# Patient Record
Sex: MALE | Race: WHITE | NOT HISPANIC OR LATINO | Employment: FULL TIME | URBAN - NONMETROPOLITAN AREA
[De-identification: names, ages, dates, MRNs, and addresses within clinical notes are randomized per-mention and may not be internally consistent; named-entity substitution may affect disease eponyms.]

---

## 2020-12-28 DIAGNOSIS — I73.9 PVD (PERIPHERAL VASCULAR DISEASE) (HCC): Primary | ICD-10-CM

## 2021-01-11 ENCOUNTER — OFFICE VISIT (OUTPATIENT)
Dept: CARDIAC SURGERY | Facility: CLINIC | Age: 51
End: 2021-01-11

## 2021-01-11 VITALS
HEART RATE: 96 BPM | DIASTOLIC BLOOD PRESSURE: 70 MMHG | HEIGHT: 72 IN | SYSTOLIC BLOOD PRESSURE: 116 MMHG | WEIGHT: 231.8 LBS | BODY MASS INDEX: 31.4 KG/M2 | OXYGEN SATURATION: 98 % | TEMPERATURE: 98.2 F

## 2021-01-11 DIAGNOSIS — E66.09 CLASS 1 OBESITY DUE TO EXCESS CALORIES WITH SERIOUS COMORBIDITY AND BODY MASS INDEX (BMI) OF 31.0 TO 31.9 IN ADULT: ICD-10-CM

## 2021-01-11 DIAGNOSIS — I10 BENIGN ESSENTIAL HTN: ICD-10-CM

## 2021-01-11 DIAGNOSIS — I73.9 PVD (PERIPHERAL VASCULAR DISEASE) (HCC): Primary | ICD-10-CM

## 2021-01-11 DIAGNOSIS — I82.432 ACUTE DEEP VEIN THROMBOSIS (DVT) OF POPLITEAL VEIN OF LEFT LOWER EXTREMITY (HCC): ICD-10-CM

## 2021-01-11 DIAGNOSIS — F17.218 NICOTINE DEPENDENCE, CIGARETTES, WITH OTHER NICOTINE-INDUCED DISORDERS: ICD-10-CM

## 2021-01-11 DIAGNOSIS — I26.99 ACUTE PULMONARY EMBOLISM WITHOUT ACUTE COR PULMONALE, UNSPECIFIED PULMONARY EMBOLISM TYPE (HCC): ICD-10-CM

## 2021-01-11 PROCEDURE — 99204 OFFICE O/P NEW MOD 45 MIN: CPT | Performed by: THORACIC SURGERY (CARDIOTHORACIC VASCULAR SURGERY)

## 2021-01-11 RX ORDER — LISINOPRIL 40 MG/1
40 TABLET ORAL DAILY
COMMUNITY
End: 2022-03-07 | Stop reason: HOSPADM

## 2021-01-11 RX ORDER — ASPIRIN 81 MG/1
81 TABLET, CHEWABLE ORAL DAILY
COMMUNITY
End: 2022-02-22 | Stop reason: HOSPADM

## 2021-01-11 NOTE — PROGRESS NOTES
1/11/2021    William Alan  1970    Chief Complaint:    Chief Complaint   Patient presents with   • Peripheral Vascular Disease       HPI:      PCP:  Omi Santamaria DO    50 y.o. male with HTN(stable, increased risk stroke, rupture), Hyperlipidemia(stable, increased risk cardiovascular events), Obesity(uncontrolled, increased risk cardiovascular events) and Smoker(uncontrolled, increased risk cardiovascular events) , DVT/PTE(stable), PVD(new, increase risk cardiovascular events).  smokes 1 PPD.  Moderate discoloration pain LEFT leg x 3 months.  Hospitalized 9/2020 [facility] DVT/PTE.  Anticoagulated eliquis, no longer taking.  States blood clot in leg gone on recent ultrasound [facility].. mother grandmother with varicose veins, spider veins.  No TIA stroke amaurosis.  No MI claudication. No other associated signs, symptoms or modifying factors.    9/2020 LEFT DVT, PTE ([facility]).  eliquis x 3 months  1/2021 ANDREW:  RIGHT 1.1 triphasic.   LEFT .97 triphasic.    The following portions of the patient's history were reviewed and updated as appropriate: allergies, current medications, past family history, past medical history, past social history, past surgical history and problem list.  Recent images independently reviewed.  Available laboratory values reviewed.    PMH:  Past Medical History:   Diagnosis Date   • HTN (hypertension)    • Hyperlipidemia    • Obesity      Past Surgical History:   Procedure Laterality Date   • AMPUTATION DIGIT Left 2007    partial index finger 1st toe (MRSA)     No family history on file.  Social History     Tobacco Use   • Smoking status: Current Every Day Smoker     Types: Cigarettes   • Smokeless tobacco: Never Used   Substance Use Topics   • Alcohol use: Never     Frequency: Never   • Drug use: Never       ALLERGIES:  No Known Allergies      MEDICATIONS:    Current Outpatient Medications:   •  aspirin 81 MG chewable tablet, Chew 81 mg Daily., Disp: , Rfl:   •  lisinopril (PRINIVIL,ZESTRIL) 40  "MG tablet, Take 40 mg by mouth Daily., Disp: , Rfl:     Review of Systems   Review of Systems   Constitution: Positive for malaise/fatigue. Negative for night sweats and weight loss.   HENT: Negative for hearing loss, hoarse voice and stridor.    Eyes: Negative for vision loss in left eye, vision loss in right eye and visual disturbance.   Cardiovascular: Positive for dyspnea on exertion and leg swelling. Negative for chest pain and palpitations.        Leg pain   Respiratory: Positive for cough and shortness of breath. Negative for hemoptysis.    Hematologic/Lymphatic: Negative for adenopathy and bleeding problem. Does not bruise/bleed easily.   Skin: Positive for color change. Negative for poor wound healing and rash.   Musculoskeletal: Positive for myalgias. Negative for arthritis, back pain, muscle weakness and neck pain.   Gastrointestinal: Negative for abdominal pain, dysphagia and heartburn.   Neurological: Negative for dizziness, numbness and seizures.   Psychiatric/Behavioral: Negative for altered mental status, depression and memory loss. The patient is not nervous/anxious.        Physical Exam   Vitals:    01/11/21 1303   BP: 116/70   BP Location: Left arm   Pulse: 96   Temp: 98.2 °F (36.8 °C)   TempSrc: Infrared   SpO2: 98%   Weight: 105 kg (231 lb 12.8 oz)   Height: 182.9 cm (72\")     Physical Exam  Constitutional:       General: He is not in acute distress.     Appearance: He is not ill-appearing.   HENT:      Head: Atraumatic.      Right Ear: Hearing normal.      Left Ear: Hearing normal.      Nose: No nasal deformity.      Mouth/Throat:      Dentition: Normal dentition. Does not have dentures.   Eyes:      General: Lids are normal.      Conjunctiva/sclera: Conjunctivae normal.      Pupils:      Right eye: Pupil is round and reactive.      Left eye: Pupil is round and reactive.   Neck:      Thyroid: No thyroid mass or thyromegaly.      Vascular: No carotid bruit or JVD.      Trachea: No tracheal " deviation.   Cardiovascular:      Rate and Rhythm: Normal rate and regular rhythm.      Pulses:           Carotid pulses are 2+ on the right side and 2+ on the left side.       Radial pulses are 2+ on the right side and 2+ on the left side.        Dorsalis pedis pulses are 2+ on the right side and 2+ on the left side.        Posterior tibial pulses are 1+ on the right side and 1+ on the left side.      Heart sounds: No murmur.      Comments: Trace edema  Moderate red-purple discoloration to bilateral legs  Moderate spider telangelectasias bilateral ankle, legs  Pulmonary:      Effort: Pulmonary effort is normal.      Breath sounds: Normal breath sounds.   Abdominal:      General: There is no distension.      Palpations: Abdomen is soft. There is no hepatomegaly, splenomegaly or mass.      Tenderness: There is no abdominal tenderness.   Musculoskeletal:         General: No deformity.      Comments: Gait normal.    Lymphadenopathy:      Cervical: No cervical adenopathy.      Upper Body:      Right upper body: No supraclavicular adenopathy.      Left upper body: No supraclavicular adenopathy.   Skin:     General: Skin is warm and dry.      Coloration: Skin is not pale.      Findings: No erythema.      Comments: No venous staining   Neurological:      Mental Status: He is alert and oriented to person, place, and time.   Psychiatric:         Speech: Speech normal.         Behavior: Behavior is cooperative.         Thought Content: Thought content normal.         Judgment: Judgment normal.       BUN 9  Creat .75  GFR >60    ASSESSMENT:  Diagnoses and all orders for this visit:    1. PVD (peripheral vascular disease) (CMS/HCC) (Primary)    2. Benign essential HTN    3. Nicotine dependence, cigarettes, with other nicotine-induced disorders    4. Class 1 obesity due to excess calories with serious comorbidity and body mass index (BMI) of 31.0 to 31.9 in adult    5. Acute deep vein thrombosis (DVT) of popliteal vein of left  lower extremity (CMS/HCC)    6. Acute pulmonary embolism without acute cor pulmonale, unspecified pulmonary embolism type (CMS/HCC)    PLAN:  Detailed discussion with William Alan regarding situation and options.  PVD, normal arterial perfusion bilateral lower extremities.  Recent DVT, PTE.  Chronic lower extremity venous stasis changes..  Multiple risk factors with severe comorbidities.  No intervention indicated at this time.  Will follow with interval imaging.  Risks, benefits discussed.  Understands and wishes to proceed with plan.    Compression stockings (20mmHg, knee high) during day.  Elevate when sitting laying down.    Return as needed.  Smoking cessation advised and assistance options offered including behavioral counseling ([facility] [employer]oking Cessation Classes), Nicotine replacement therapy (patches or gum), pharmacologic therapy (Chantix, Wellbutrin). patient understands that continued use of tobacco products increases risk of heart disease, stroke, cancer; counseling for 3-5min.  Obesity Class  1. Increased risk cardiovascular events, sleep and breathing disorders, joint issues, type 2 diabetes mellitus. Options for weight management, heart healthy diet, exercise programs, and associated health risks of obesity discussed.    Recommended regular physical activity, progressive walking program.  Continue current medications as directed.  Will Obtain relevant old records.    Thank you for the opportunity to participate in this patient's care.    Copy to primary care provider.

## 2022-02-18 ENCOUNTER — APPOINTMENT (OUTPATIENT)
Dept: ULTRASOUND IMAGING | Facility: HOSPITAL | Age: 52
End: 2022-02-18

## 2022-02-18 ENCOUNTER — HOSPITAL ENCOUNTER (INPATIENT)
Facility: HOSPITAL | Age: 52
LOS: 4 days | Discharge: HOME | End: 2022-02-22
Attending: FAMILY MEDICINE | Admitting: FAMILY MEDICINE

## 2022-02-18 DIAGNOSIS — I70.209 ARTERIAL OCCLUSION, LOWER EXTREMITY: Primary | ICD-10-CM

## 2022-02-18 DIAGNOSIS — I10 BENIGN HYPERTENSION: ICD-10-CM

## 2022-02-18 DIAGNOSIS — I73.9 PVD (PERIPHERAL VASCULAR DISEASE): ICD-10-CM

## 2022-02-18 LAB
ALBUMIN SERPL-MCNC: 3.8 G/DL (ref 3.5–5.2)
ALBUMIN/GLOB SERPL: 1.7 G/DL
ALP SERPL-CCNC: 106 U/L (ref 39–117)
ALT SERPL W P-5'-P-CCNC: 10 U/L (ref 1–41)
ANION GAP SERPL CALCULATED.3IONS-SCNC: 10 MMOL/L (ref 5–15)
APTT PPP: 28.2 SECONDS (ref 20–40.3)
AST SERPL-CCNC: 14 U/L (ref 1–40)
BASOPHILS # BLD AUTO: 0.06 10*3/MM3 (ref 0–0.2)
BASOPHILS # BLD AUTO: 0.06 10*3/MM3 (ref 0–0.2)
BASOPHILS NFR BLD AUTO: 0.8 % (ref 0–1.5)
BASOPHILS NFR BLD AUTO: 0.9 % (ref 0–1.5)
BILIRUB SERPL-MCNC: 1 MG/DL (ref 0–1.2)
BUN SERPL-MCNC: 5 MG/DL (ref 6–20)
BUN/CREAT SERPL: 5.3 (ref 7–25)
CALCIUM SPEC-SCNC: 8.9 MG/DL (ref 8.6–10.5)
CHLORIDE SERPL-SCNC: 98 MMOL/L (ref 98–107)
CO2 SERPL-SCNC: 27 MMOL/L (ref 22–29)
CREAT SERPL-MCNC: 0.95 MG/DL (ref 0.76–1.27)
CRP SERPL-MCNC: 0.99 MG/DL (ref 0–0.5)
D-LACTATE SERPL-SCNC: 1.2 MMOL/L (ref 0.5–2)
DEPRECATED RDW RBC AUTO: 48.9 FL (ref 37–54)
DEPRECATED RDW RBC AUTO: 49.3 FL (ref 37–54)
EOSINOPHIL # BLD AUTO: 0.21 10*3/MM3 (ref 0–0.4)
EOSINOPHIL # BLD AUTO: 0.23 10*3/MM3 (ref 0–0.4)
EOSINOPHIL NFR BLD AUTO: 3 % (ref 0.3–6.2)
EOSINOPHIL NFR BLD AUTO: 3.3 % (ref 0.3–6.2)
ERYTHROCYTE [DISTWIDTH] IN BLOOD BY AUTOMATED COUNT: 13.9 % (ref 12.3–15.4)
ERYTHROCYTE [DISTWIDTH] IN BLOOD BY AUTOMATED COUNT: 14.1 % (ref 12.3–15.4)
ERYTHROCYTE [SEDIMENTATION RATE] IN BLOOD: 5 MM/HR (ref 0–15)
ETHANOL BLD-MCNC: <10 MG/DL (ref 0–10)
ETHANOL UR QL: <0.01 %
FLUAV SUBTYP SPEC NAA+PROBE: NOT DETECTED
FLUBV RNA ISLT QL NAA+PROBE: NOT DETECTED
GFR SERPL CREATININE-BSD FRML MDRD: 84 ML/MIN/1.73
GLOBULIN UR ELPH-MCNC: 2.3 GM/DL
GLUCOSE SERPL-MCNC: 98 MG/DL (ref 65–99)
HCT VFR BLD AUTO: 56 % (ref 37.5–51)
HCT VFR BLD AUTO: 56.5 % (ref 37.5–51)
HGB BLD-MCNC: 19.2 G/DL (ref 13–17.7)
HGB BLD-MCNC: 19.6 G/DL (ref 13–17.7)
HOLD SPECIMEN: NORMAL
IMM GRANULOCYTES # BLD AUTO: 0.02 10*3/MM3 (ref 0–0.05)
IMM GRANULOCYTES # BLD AUTO: 0.02 10*3/MM3 (ref 0–0.05)
IMM GRANULOCYTES NFR BLD AUTO: 0.3 % (ref 0–0.5)
IMM GRANULOCYTES NFR BLD AUTO: 0.3 % (ref 0–0.5)
INR PPP: 1.05 (ref 0.8–1.2)
LYMPHOCYTES # BLD AUTO: 1.43 10*3/MM3 (ref 0.7–3.1)
LYMPHOCYTES # BLD AUTO: 1.49 10*3/MM3 (ref 0.7–3.1)
LYMPHOCYTES NFR BLD AUTO: 20.2 % (ref 19.6–45.3)
LYMPHOCYTES NFR BLD AUTO: 21.4 % (ref 19.6–45.3)
MCH RBC QN AUTO: 32.6 PG (ref 26.6–33)
MCH RBC QN AUTO: 33.4 PG (ref 26.6–33)
MCHC RBC AUTO-ENTMCNC: 34.3 G/DL (ref 31.5–35.7)
MCHC RBC AUTO-ENTMCNC: 34.7 G/DL (ref 31.5–35.7)
MCV RBC AUTO: 95.1 FL (ref 79–97)
MCV RBC AUTO: 96.4 FL (ref 79–97)
MONOCYTES # BLD AUTO: 0.69 10*3/MM3 (ref 0.1–0.9)
MONOCYTES # BLD AUTO: 0.73 10*3/MM3 (ref 0.1–0.9)
MONOCYTES NFR BLD AUTO: 10.5 % (ref 5–12)
MONOCYTES NFR BLD AUTO: 9.7 % (ref 5–12)
NEUTROPHILS NFR BLD AUTO: 4.44 10*3/MM3 (ref 1.7–7)
NEUTROPHILS NFR BLD AUTO: 4.67 10*3/MM3 (ref 1.7–7)
NEUTROPHILS NFR BLD AUTO: 63.6 % (ref 42.7–76)
NEUTROPHILS NFR BLD AUTO: 66 % (ref 42.7–76)
NRBC BLD AUTO-RTO: 0 /100 WBC (ref 0–0.2)
NRBC BLD AUTO-RTO: 0 /100 WBC (ref 0–0.2)
NT-PROBNP SERPL-MCNC: 373.5 PG/ML (ref 0–900)
PLATELET # BLD AUTO: 107 10*3/MM3 (ref 140–450)
PLATELET # BLD AUTO: 112 10*3/MM3 (ref 140–450)
PMV BLD AUTO: 11 FL (ref 6–12)
PMV BLD AUTO: 11.1 FL (ref 6–12)
POTASSIUM SERPL-SCNC: 4.1 MMOL/L (ref 3.5–5.2)
PROT SERPL-MCNC: 6.1 G/DL (ref 6–8.5)
PROTHROMBIN TIME: 13.6 SECONDS (ref 11.1–15.3)
RBC # BLD AUTO: 5.86 10*6/MM3 (ref 4.14–5.8)
RBC # BLD AUTO: 5.89 10*6/MM3 (ref 4.14–5.8)
RBC MORPH BLD: NORMAL
SARS-COV-2 RNA PNL SPEC NAA+PROBE: NOT DETECTED
SMALL PLATELETS BLD QL SMEAR: NORMAL
SODIUM SERPL-SCNC: 135 MMOL/L (ref 136–145)
WBC MORPH BLD: NORMAL
WBC NRBC COR # BLD: 6.97 10*3/MM3 (ref 3.4–10.8)
WBC NRBC COR # BLD: 7.08 10*3/MM3 (ref 3.4–10.8)

## 2022-02-18 PROCEDURE — 99284 EMERGENCY DEPT VISIT MOD MDM: CPT

## 2022-02-18 PROCEDURE — 85025 COMPLETE CBC W/AUTO DIFF WBC: CPT | Performed by: PHYSICIAN ASSISTANT

## 2022-02-18 PROCEDURE — 25010000002 HEPARIN (PORCINE) PER 1000 UNITS

## 2022-02-18 PROCEDURE — 25010000002 ONDANSETRON PER 1 MG: Performed by: PHYSICIAN ASSISTANT

## 2022-02-18 PROCEDURE — 83880 ASSAY OF NATRIURETIC PEPTIDE: CPT | Performed by: PHYSICIAN ASSISTANT

## 2022-02-18 PROCEDURE — 93926 LOWER EXTREMITY STUDY: CPT

## 2022-02-18 PROCEDURE — 85610 PROTHROMBIN TIME: CPT | Performed by: PHYSICIAN ASSISTANT

## 2022-02-18 PROCEDURE — 87636 SARSCOV2 & INF A&B AMP PRB: CPT | Performed by: PHYSICIAN ASSISTANT

## 2022-02-18 PROCEDURE — 83605 ASSAY OF LACTIC ACID: CPT | Performed by: PHYSICIAN ASSISTANT

## 2022-02-18 PROCEDURE — 25010000002 VANCOMYCIN 10 G RECONSTITUTED SOLUTION

## 2022-02-18 PROCEDURE — 25010000002 MORPHINE PER 10 MG: Performed by: FAMILY MEDICINE

## 2022-02-18 PROCEDURE — 93971 EXTREMITY STUDY: CPT

## 2022-02-18 PROCEDURE — 87040 BLOOD CULTURE FOR BACTERIA: CPT | Performed by: PHYSICIAN ASSISTANT

## 2022-02-18 PROCEDURE — 85651 RBC SED RATE NONAUTOMATED: CPT | Performed by: PHYSICIAN ASSISTANT

## 2022-02-18 PROCEDURE — G0378 HOSPITAL OBSERVATION PER HR: HCPCS

## 2022-02-18 PROCEDURE — 87040 BLOOD CULTURE FOR BACTERIA: CPT | Performed by: HOSPITALIST

## 2022-02-18 PROCEDURE — 25010000002 HYDROMORPHONE 1 MG/ML SOLUTION: Performed by: FAMILY MEDICINE

## 2022-02-18 PROCEDURE — 85007 BL SMEAR W/DIFF WBC COUNT: CPT | Performed by: PHYSICIAN ASSISTANT

## 2022-02-18 PROCEDURE — 80053 COMPREHEN METABOLIC PANEL: CPT | Performed by: PHYSICIAN ASSISTANT

## 2022-02-18 PROCEDURE — 25010000002 HEPARIN (PORCINE) PER 1000 UNITS: Performed by: PHYSICIAN ASSISTANT

## 2022-02-18 PROCEDURE — 82077 ASSAY SPEC XCP UR&BREATH IA: CPT | Performed by: PHYSICIAN ASSISTANT

## 2022-02-18 PROCEDURE — 86140 C-REACTIVE PROTEIN: CPT | Performed by: PHYSICIAN ASSISTANT

## 2022-02-18 PROCEDURE — 85730 THROMBOPLASTIN TIME PARTIAL: CPT | Performed by: PHYSICIAN ASSISTANT

## 2022-02-18 RX ORDER — LORAZEPAM 2 MG/ML
2 INJECTION INTRAMUSCULAR
Status: DISCONTINUED | OUTPATIENT
Start: 2022-02-18 | End: 2022-02-22 | Stop reason: HOSPADM

## 2022-02-18 RX ORDER — HEPARIN SOD,PORCINE/0.9 % NACL 25000/250
14.3 INTRAVENOUS SOLUTION INTRAVENOUS
Status: DISCONTINUED | OUTPATIENT
Start: 2022-02-18 | End: 2022-02-19

## 2022-02-18 RX ORDER — HEPARIN SODIUM 5000 [USP'U]/ML
80 INJECTION, SOLUTION INTRAVENOUS; SUBCUTANEOUS ONCE
Status: COMPLETED | OUTPATIENT
Start: 2022-02-18 | End: 2022-02-18

## 2022-02-18 RX ORDER — HEPARIN SODIUM 5000 [USP'U]/ML
40 INJECTION, SOLUTION INTRAVENOUS; SUBCUTANEOUS AS NEEDED
Status: DISCONTINUED | OUTPATIENT
Start: 2022-02-18 | End: 2022-02-19

## 2022-02-18 RX ORDER — LISINOPRIL 40 MG/1
40 TABLET ORAL DAILY
Status: DISCONTINUED | OUTPATIENT
Start: 2022-02-19 | End: 2022-02-22 | Stop reason: HOSPADM

## 2022-02-18 RX ORDER — SODIUM CHLORIDE 0.9 % (FLUSH) 0.9 %
10 SYRINGE (ML) INJECTION AS NEEDED
Status: DISCONTINUED | OUTPATIENT
Start: 2022-02-18 | End: 2022-02-22 | Stop reason: HOSPADM

## 2022-02-18 RX ORDER — LORAZEPAM 0.5 MG/1
1 TABLET ORAL
Status: DISCONTINUED | OUTPATIENT
Start: 2022-02-18 | End: 2022-02-22 | Stop reason: HOSPADM

## 2022-02-18 RX ORDER — LORAZEPAM 2 MG/1
2 TABLET ORAL
Status: DISCONTINUED | OUTPATIENT
Start: 2022-02-18 | End: 2022-02-22 | Stop reason: HOSPADM

## 2022-02-18 RX ORDER — ONDANSETRON 2 MG/ML
4 INJECTION INTRAMUSCULAR; INTRAVENOUS ONCE
Status: COMPLETED | OUTPATIENT
Start: 2022-02-18 | End: 2022-02-18

## 2022-02-18 RX ORDER — SODIUM CHLORIDE 0.9 % (FLUSH) 0.9 %
10 SYRINGE (ML) INJECTION EVERY 12 HOURS SCHEDULED
Status: DISCONTINUED | OUTPATIENT
Start: 2022-02-18 | End: 2022-02-22 | Stop reason: HOSPADM

## 2022-02-18 RX ORDER — LORAZEPAM 2 MG/ML
1 INJECTION INTRAMUSCULAR
Status: DISCONTINUED | OUTPATIENT
Start: 2022-02-18 | End: 2022-02-22 | Stop reason: HOSPADM

## 2022-02-18 RX ORDER — HEPARIN SODIUM 5000 [USP'U]/ML
80 INJECTION, SOLUTION INTRAVENOUS; SUBCUTANEOUS AS NEEDED
Status: DISCONTINUED | OUTPATIENT
Start: 2022-02-18 | End: 2022-02-19

## 2022-02-18 RX ADMIN — ONDANSETRON 4 MG: 2 INJECTION INTRAMUSCULAR; INTRAVENOUS at 15:51

## 2022-02-18 RX ADMIN — Medication 14.3 UNITS/KG/HR: at 19:50

## 2022-02-18 RX ADMIN — SODIUM CHLORIDE, PRESERVATIVE FREE 10 ML: 5 INJECTION INTRAVENOUS at 20:29

## 2022-02-18 RX ADMIN — Medication 2000 MG: at 20:29

## 2022-02-18 RX ADMIN — HYDROMORPHONE HYDROCHLORIDE 1 MG: 1 INJECTION, SOLUTION INTRAMUSCULAR; INTRAVENOUS; SUBCUTANEOUS at 18:47

## 2022-02-18 RX ADMIN — HEPARIN SODIUM 8400 UNITS: 5000 INJECTION INTRAVENOUS; SUBCUTANEOUS at 19:49

## 2022-02-18 RX ADMIN — Medication 10 ML: at 15:52

## 2022-02-18 RX ADMIN — MORPHINE SULFATE 4 MG: 4 INJECTION INTRAVENOUS at 15:51

## 2022-02-19 ENCOUNTER — APPOINTMENT (OUTPATIENT)
Dept: CARDIOLOGY | Facility: HOSPITAL | Age: 52
End: 2022-02-19

## 2022-02-19 ENCOUNTER — APPOINTMENT (OUTPATIENT)
Dept: CT IMAGING | Facility: HOSPITAL | Age: 52
End: 2022-02-19

## 2022-02-19 LAB
ANION GAP SERPL CALCULATED.3IONS-SCNC: 8 MMOL/L (ref 5–15)
APTT PPP: 38.7 SECONDS (ref 20–40.3)
APTT PPP: 42.1 SECONDS (ref 20–40.3)
APTT PPP: 68.9 SECONDS (ref 20–40.3)
BASOPHILS # BLD AUTO: 0.07 10*3/MM3 (ref 0–0.2)
BASOPHILS NFR BLD AUTO: 1.4 % (ref 0–1.5)
BUN SERPL-MCNC: 8 MG/DL (ref 6–20)
BUN/CREAT SERPL: 8.9 (ref 7–25)
CALCIUM SPEC-SCNC: 8.2 MG/DL (ref 8.6–10.5)
CHLORIDE SERPL-SCNC: 102 MMOL/L (ref 98–107)
CO2 SERPL-SCNC: 25 MMOL/L (ref 22–29)
CREAT SERPL-MCNC: 0.9 MG/DL (ref 0.76–1.27)
DEPRECATED RDW RBC AUTO: 50.4 FL (ref 37–54)
EOSINOPHIL # BLD AUTO: 0.21 10*3/MM3 (ref 0–0.4)
EOSINOPHIL NFR BLD AUTO: 4.1 % (ref 0.3–6.2)
ERYTHROCYTE [DISTWIDTH] IN BLOOD BY AUTOMATED COUNT: 14.1 % (ref 12.3–15.4)
GFR SERPL CREATININE-BSD FRML MDRD: 89 ML/MIN/1.73
GLUCOSE SERPL-MCNC: 95 MG/DL (ref 65–99)
HBA1C MFR BLD: 5.9 % (ref 4.8–5.6)
HCT VFR BLD AUTO: 54.7 % (ref 37.5–51)
HCYS SERPL-MCNC: 18.6 UMOL/L (ref 0–15)
HGB BLD-MCNC: 18.7 G/DL (ref 13–17.7)
IMM GRANULOCYTES # BLD AUTO: 0.03 10*3/MM3 (ref 0–0.05)
IMM GRANULOCYTES NFR BLD AUTO: 0.6 % (ref 0–0.5)
LYMPHOCYTES # BLD AUTO: 1.28 10*3/MM3 (ref 0.7–3.1)
LYMPHOCYTES NFR BLD AUTO: 24.9 % (ref 19.6–45.3)
MCH RBC QN AUTO: 32.9 PG (ref 26.6–33)
MCHC RBC AUTO-ENTMCNC: 34.2 G/DL (ref 31.5–35.7)
MCV RBC AUTO: 96.3 FL (ref 79–97)
MONOCYTES # BLD AUTO: 0.62 10*3/MM3 (ref 0.1–0.9)
MONOCYTES NFR BLD AUTO: 12 % (ref 5–12)
NEUTROPHILS NFR BLD AUTO: 2.94 10*3/MM3 (ref 1.7–7)
NEUTROPHILS NFR BLD AUTO: 57 % (ref 42.7–76)
NRBC BLD AUTO-RTO: 0 /100 WBC (ref 0–0.2)
PLATELET # BLD AUTO: 102 10*3/MM3 (ref 140–450)
PMV BLD AUTO: 11.2 FL (ref 6–12)
POTASSIUM SERPL-SCNC: 4.4 MMOL/L (ref 3.5–5.2)
RBC # BLD AUTO: 5.68 10*6/MM3 (ref 4.14–5.8)
SODIUM SERPL-SCNC: 135 MMOL/L (ref 136–145)
WBC NRBC COR # BLD: 5.15 10*3/MM3 (ref 3.4–10.8)

## 2022-02-19 PROCEDURE — 81240 F2 GENE: CPT | Performed by: NURSE PRACTITIONER

## 2022-02-19 PROCEDURE — 81241 F5 GENE: CPT | Performed by: NURSE PRACTITIONER

## 2022-02-19 PROCEDURE — 83036 HEMOGLOBIN GLYCOSYLATED A1C: CPT | Performed by: NURSE PRACTITIONER

## 2022-02-19 PROCEDURE — 85730 THROMBOPLASTIN TIME PARTIAL: CPT | Performed by: FAMILY MEDICINE

## 2022-02-19 PROCEDURE — 36415 COLL VENOUS BLD VENIPUNCTURE: CPT | Performed by: FAMILY MEDICINE

## 2022-02-19 PROCEDURE — 71275 CT ANGIOGRAPHY CHEST: CPT

## 2022-02-19 PROCEDURE — 25010000002 HYDROMORPHONE 1 MG/ML SOLUTION: Performed by: HOSPITALIST

## 2022-02-19 PROCEDURE — 0 IOPAMIDOL PER 1 ML: Performed by: FAMILY MEDICINE

## 2022-02-19 PROCEDURE — 85730 THROMBOPLASTIN TIME PARTIAL: CPT | Performed by: THORACIC SURGERY (CARDIOTHORACIC VASCULAR SURGERY)

## 2022-02-19 PROCEDURE — 93306 TTE W/DOPPLER COMPLETE: CPT

## 2022-02-19 PROCEDURE — 75635 CT ANGIO ABDOMINAL ARTERIES: CPT

## 2022-02-19 PROCEDURE — 25010000002 HEPARIN (PORCINE) PER 1000 UNITS: Performed by: THORACIC SURGERY (CARDIOTHORACIC VASCULAR SURGERY)

## 2022-02-19 PROCEDURE — 25010000002 HEPARIN (PORCINE) PER 1000 UNITS: Performed by: FAMILY MEDICINE

## 2022-02-19 PROCEDURE — 83090 ASSAY OF HOMOCYSTEINE: CPT | Performed by: NURSE PRACTITIONER

## 2022-02-19 PROCEDURE — 80048 BASIC METABOLIC PNL TOTAL CA: CPT | Performed by: HOSPITALIST

## 2022-02-19 PROCEDURE — 85025 COMPLETE CBC W/AUTO DIFF WBC: CPT | Performed by: HOSPITALIST

## 2022-02-19 PROCEDURE — 25010000002 VANCOMYCIN 10 G RECONSTITUTED SOLUTION: Performed by: HOSPITALIST

## 2022-02-19 RX ORDER — HEPARIN SOD,PORCINE/0.9 % NACL 25000/250
25.4 INTRAVENOUS SOLUTION INTRAVENOUS
Status: DISPENSED | OUTPATIENT
Start: 2022-02-19 | End: 2022-02-21

## 2022-02-19 RX ORDER — HEPARIN SODIUM 5000 [USP'U]/ML
4200 INJECTION, SOLUTION INTRAVENOUS; SUBCUTANEOUS EVERY 8 HOURS SCHEDULED
Status: DISCONTINUED | OUTPATIENT
Start: 2022-02-19 | End: 2022-02-19

## 2022-02-19 RX ORDER — HEPARIN SODIUM 5000 [USP'U]/ML
40 INJECTION, SOLUTION INTRAVENOUS; SUBCUTANEOUS AS NEEDED
Status: DISPENSED | OUTPATIENT
Start: 2022-02-19 | End: 2022-02-21

## 2022-02-19 RX ORDER — HEPARIN SODIUM 5000 [USP'U]/ML
80 INJECTION, SOLUTION INTRAVENOUS; SUBCUTANEOUS AS NEEDED
Status: DISPENSED | OUTPATIENT
Start: 2022-02-19 | End: 2022-02-21

## 2022-02-19 RX ADMIN — HYDROMORPHONE HYDROCHLORIDE 1 MG: 1 INJECTION, SOLUTION INTRAMUSCULAR; INTRAVENOUS; SUBCUTANEOUS at 00:58

## 2022-02-19 RX ADMIN — HYDROMORPHONE HYDROCHLORIDE 1 MG: 1 INJECTION, SOLUTION INTRAMUSCULAR; INTRAVENOUS; SUBCUTANEOUS at 10:46

## 2022-02-19 RX ADMIN — LISINOPRIL 40 MG: 40 TABLET ORAL at 08:02

## 2022-02-19 RX ADMIN — HYDROMORPHONE HYDROCHLORIDE 1 MG: 1 INJECTION, SOLUTION INTRAMUSCULAR; INTRAVENOUS; SUBCUTANEOUS at 14:55

## 2022-02-19 RX ADMIN — HYDROMORPHONE HYDROCHLORIDE 1 MG: 1 INJECTION, SOLUTION INTRAMUSCULAR; INTRAVENOUS; SUBCUTANEOUS at 17:45

## 2022-02-19 RX ADMIN — VANCOMYCIN HYDROCHLORIDE 2000 MG: 10 INJECTION, POWDER, LYOPHILIZED, FOR SOLUTION INTRAVENOUS at 21:05

## 2022-02-19 RX ADMIN — SODIUM CHLORIDE, PRESERVATIVE FREE 10 ML: 5 INJECTION INTRAVENOUS at 21:05

## 2022-02-19 RX ADMIN — NITROGLYCERIN 0.5 INCH: 20 OINTMENT TOPICAL at 11:51

## 2022-02-19 RX ADMIN — HEPARIN SODIUM 4200 UNITS: 5000 INJECTION INTRAVENOUS; SUBCUTANEOUS at 03:59

## 2022-02-19 RX ADMIN — HYDROMORPHONE HYDROCHLORIDE 1 MG: 1 INJECTION, SOLUTION INTRAMUSCULAR; INTRAVENOUS; SUBCUTANEOUS at 21:05

## 2022-02-19 RX ADMIN — VANCOMYCIN HYDROCHLORIDE 1500 MG: 10 INJECTION, POWDER, LYOPHILIZED, FOR SOLUTION INTRAVENOUS at 08:11

## 2022-02-19 RX ADMIN — IOPAMIDOL 120 ML: 755 INJECTION, SOLUTION INTRAVENOUS at 10:02

## 2022-02-19 RX ADMIN — HEPARIN SODIUM 8400 UNITS: 5000 INJECTION, SOLUTION INTRAVENOUS; SUBCUTANEOUS at 18:02

## 2022-02-19 RX ADMIN — HEPARIN SODIUM 4200 UNITS: 5000 INJECTION INTRAVENOUS; SUBCUTANEOUS at 10:45

## 2022-02-19 RX ADMIN — HEPARIN SODIUM 18.3 UNITS/KG/HR: 10000 INJECTION INTRAVENOUS; SUBCUTANEOUS at 11:42

## 2022-02-19 RX ADMIN — NITROGLYCERIN 0.5 INCH: 20 OINTMENT TOPICAL at 17:41

## 2022-02-19 RX ADMIN — HYDROMORPHONE HYDROCHLORIDE 1 MG: 1 INJECTION, SOLUTION INTRAMUSCULAR; INTRAVENOUS; SUBCUTANEOUS at 07:50

## 2022-02-19 RX ADMIN — SODIUM CHLORIDE, PRESERVATIVE FREE 10 ML: 5 INJECTION INTRAVENOUS at 08:11

## 2022-02-20 LAB
ANION GAP SERPL CALCULATED.3IONS-SCNC: 13 MMOL/L (ref 5–15)
APTT PPP: 108 SECONDS (ref 20–40.3)
APTT PPP: 63.2 SECONDS (ref 20–40.3)
APTT PPP: 68.1 SECONDS (ref 20–40.3)
APTT PPP: 77.7 SECONDS (ref 20–40.3)
BASOPHILS # BLD AUTO: 0.05 10*3/MM3 (ref 0–0.2)
BASOPHILS NFR BLD AUTO: 1 % (ref 0–1.5)
BH CV ECHO MEAS - ACS: 2.1 CM
BH CV ECHO MEAS - AO MAX PG (FULL): 1.1 MMHG
BH CV ECHO MEAS - AO MAX PG: 13.4 MMHG
BH CV ECHO MEAS - AO MEAN PG (FULL): -1 MMHG
BH CV ECHO MEAS - AO MEAN PG: 7 MMHG
BH CV ECHO MEAS - AO ROOT AREA (BSA CORRECTED): 1.7
BH CV ECHO MEAS - AO ROOT AREA: 11.9 CM^2
BH CV ECHO MEAS - AO ROOT DIAM: 3.9 CM
BH CV ECHO MEAS - AO V2 MAX: 183 CM/SEC
BH CV ECHO MEAS - AO V2 MEAN: 127 CM/SEC
BH CV ECHO MEAS - AO V2 VTI: 35.5 CM
BH CV ECHO MEAS - ASC AORTA: 3.6 CM
BH CV ECHO MEAS - AVA(I,A): 3.5 CM^2
BH CV ECHO MEAS - AVA(I,D): 3.5 CM^2
BH CV ECHO MEAS - AVA(V,A): 3.3 CM^2
BH CV ECHO MEAS - AVA(V,D): 3.3 CM^2
BH CV ECHO MEAS - BSA(HAYCOCK): 2.3 M^2
BH CV ECHO MEAS - BSA: 2.3 M^2
BH CV ECHO MEAS - BZI_BMI: 31.5 KILOGRAMS/M^2
BH CV ECHO MEAS - BZI_METRIC_HEIGHT: 182.9 CM
BH CV ECHO MEAS - BZI_METRIC_WEIGHT: 105.2 KG
BH CV ECHO MEAS - EDV(CUBED): 91.1 ML
BH CV ECHO MEAS - EDV(MOD-SP2): 63.1 ML
BH CV ECHO MEAS - EDV(MOD-SP4): 78.7 ML
BH CV ECHO MEAS - EDV(TEICH): 92.4 ML
BH CV ECHO MEAS - EF(CUBED): 66 %
BH CV ECHO MEAS - EF(MOD-SP2): 59.6 %
BH CV ECHO MEAS - EF(MOD-SP4): 64.4 %
BH CV ECHO MEAS - EF(TEICH): 57.7 %
BH CV ECHO MEAS - ESV(CUBED): 31 ML
BH CV ECHO MEAS - ESV(MOD-SP2): 25.5 ML
BH CV ECHO MEAS - ESV(MOD-SP4): 28 ML
BH CV ECHO MEAS - ESV(TEICH): 39.1 ML
BH CV ECHO MEAS - FS: 30.2 %
BH CV ECHO MEAS - IVS/LVPW: 1.2
BH CV ECHO MEAS - IVSD: 1.1 CM
BH CV ECHO MEAS - LA DIMENSION: 4 CM
BH CV ECHO MEAS - LA/AO: 0.95
BH CV ECHO MEAS - LV DIASTOLIC VOL/BSA (35-75): 34.7 ML/M^2
BH CV ECHO MEAS - LV MASS(C)D: 151.2 GRAMS
BH CV ECHO MEAS - LV MASS(C)DI: 66.6 GRAMS/M^2
BH CV ECHO MEAS - LV MAX PG: 12.3 MMHG
BH CV ECHO MEAS - LV MEAN PG: 8 MMHG
BH CV ECHO MEAS - LV SYSTOLIC VOL/BSA (12-30): 12.3 ML/M^2
BH CV ECHO MEAS - LV V1 MAX: 175 CM/SEC
BH CV ECHO MEAS - LV V1 MEAN: 135 CM/SEC
BH CV ECHO MEAS - LV V1 VTI: 36 CM
BH CV ECHO MEAS - LVIDD: 4.5 CM
BH CV ECHO MEAS - LVIDS: 3.1 CM
BH CV ECHO MEAS - LVLD AP2: 8.1 CM
BH CV ECHO MEAS - LVLD AP4: 8.7 CM
BH CV ECHO MEAS - LVLS AP2: 6.7 CM
BH CV ECHO MEAS - LVLS AP4: 6.9 CM
BH CV ECHO MEAS - LVOT AREA (M): 3.5 CM^2
BH CV ECHO MEAS - LVOT AREA: 3.5 CM^2
BH CV ECHO MEAS - LVOT DIAM: 2.1 CM
BH CV ECHO MEAS - LVPWD: 0.92 CM
BH CV ECHO MEAS - MV A MAX VEL: 84.8 CM/SEC
BH CV ECHO MEAS - MV DEC SLOPE: 722 CM/SEC^2
BH CV ECHO MEAS - MV E MAX VEL: 86.1 CM/SEC
BH CV ECHO MEAS - MV E/A: 1
BH CV ECHO MEAS - MV MAX PG: 5.9 MMHG
BH CV ECHO MEAS - MV MEAN PG: 3 MMHG
BH CV ECHO MEAS - MV P1/2T MAX VEL: 123 CM/SEC
BH CV ECHO MEAS - MV P1/2T: 49.9 MSEC
BH CV ECHO MEAS - MV V2 MAX: 121 CM/SEC
BH CV ECHO MEAS - MV V2 MEAN: 74.3 CM/SEC
BH CV ECHO MEAS - MV V2 VTI: 29.2 CM
BH CV ECHO MEAS - MVA P1/2T LCG: 1.8 CM^2
BH CV ECHO MEAS - MVA(P1/2T): 4.4 CM^2
BH CV ECHO MEAS - MVA(VTI): 4.3 CM^2
BH CV ECHO MEAS - PA MAX PG: 5.2 MMHG
BH CV ECHO MEAS - PA V2 MAX: 114 CM/SEC
BH CV ECHO MEAS - RAP SYSTOLE: 5 MMHG
BH CV ECHO MEAS - RVDD: 2.5 CM
BH CV ECHO MEAS - RVSP: 24.5 MMHG
BH CV ECHO MEAS - SI(AO): 186.9 ML/M^2
BH CV ECHO MEAS - SI(CUBED): 26.5 ML/M^2
BH CV ECHO MEAS - SI(LVOT): 55 ML/M^2
BH CV ECHO MEAS - SI(MOD-SP2): 16.6 ML/M^2
BH CV ECHO MEAS - SI(MOD-SP4): 22.3 ML/M^2
BH CV ECHO MEAS - SI(TEICH): 23.5 ML/M^2
BH CV ECHO MEAS - SV(AO): 424.1 ML
BH CV ECHO MEAS - SV(CUBED): 60.2 ML
BH CV ECHO MEAS - SV(LVOT): 124.7 ML
BH CV ECHO MEAS - SV(MOD-SP2): 37.6 ML
BH CV ECHO MEAS - SV(MOD-SP4): 50.7 ML
BH CV ECHO MEAS - SV(TEICH): 53.3 ML
BH CV ECHO MEAS - TR MAX VEL: 221 CM/SEC
BUN SERPL-MCNC: 8 MG/DL (ref 6–20)
BUN/CREAT SERPL: 10.8 (ref 7–25)
CALCIUM SPEC-SCNC: 8.4 MG/DL (ref 8.6–10.5)
CHLORIDE SERPL-SCNC: 100 MMOL/L (ref 98–107)
CO2 SERPL-SCNC: 22 MMOL/L (ref 22–29)
CREAT SERPL-MCNC: 0.74 MG/DL (ref 0.76–1.27)
DEPRECATED RDW RBC AUTO: 47.9 FL (ref 37–54)
EOSINOPHIL # BLD AUTO: 0.12 10*3/MM3 (ref 0–0.4)
EOSINOPHIL NFR BLD AUTO: 2.3 % (ref 0.3–6.2)
ERYTHROCYTE [DISTWIDTH] IN BLOOD BY AUTOMATED COUNT: 13.6 % (ref 12.3–15.4)
GFR SERPL CREATININE-BSD FRML MDRD: 112 ML/MIN/1.73
GLUCOSE SERPL-MCNC: 80 MG/DL (ref 65–99)
HCT VFR BLD AUTO: 55.1 % (ref 37.5–51)
HGB BLD-MCNC: 18.8 G/DL (ref 13–17.7)
IMM GRANULOCYTES # BLD AUTO: 0.02 10*3/MM3 (ref 0–0.05)
IMM GRANULOCYTES NFR BLD AUTO: 0.4 % (ref 0–0.5)
LYMPHOCYTES # BLD AUTO: 0.95 10*3/MM3 (ref 0.7–3.1)
LYMPHOCYTES NFR BLD AUTO: 18.3 % (ref 19.6–45.3)
MAXIMAL PREDICTED HEART RATE: 169 BPM
MCH RBC QN AUTO: 32.8 PG (ref 26.6–33)
MCHC RBC AUTO-ENTMCNC: 34.1 G/DL (ref 31.5–35.7)
MCV RBC AUTO: 96 FL (ref 79–97)
MONOCYTES # BLD AUTO: 0.55 10*3/MM3 (ref 0.1–0.9)
MONOCYTES NFR BLD AUTO: 10.6 % (ref 5–12)
NEUTROPHILS NFR BLD AUTO: 3.51 10*3/MM3 (ref 1.7–7)
NEUTROPHILS NFR BLD AUTO: 67.4 % (ref 42.7–76)
NRBC BLD AUTO-RTO: 0 /100 WBC (ref 0–0.2)
PLATELET # BLD AUTO: 101 10*3/MM3 (ref 140–450)
PMV BLD AUTO: 12 FL (ref 6–12)
POTASSIUM SERPL-SCNC: 4.2 MMOL/L (ref 3.5–5.2)
RBC # BLD AUTO: 5.74 10*6/MM3 (ref 4.14–5.8)
SODIUM SERPL-SCNC: 135 MMOL/L (ref 136–145)
STRESS TARGET HR: 144 BPM
VANCOMYCIN TROUGH SERPL-MCNC: 17.1 MCG/ML (ref 5–20)
WBC NRBC COR # BLD: 5.2 10*3/MM3 (ref 3.4–10.8)

## 2022-02-20 PROCEDURE — 25010000002 HEPARIN (PORCINE) PER 1000 UNITS: Performed by: FAMILY MEDICINE

## 2022-02-20 PROCEDURE — 80202 ASSAY OF VANCOMYCIN: CPT | Performed by: HOSPITALIST

## 2022-02-20 PROCEDURE — 85730 THROMBOPLASTIN TIME PARTIAL: CPT | Performed by: FAMILY MEDICINE

## 2022-02-20 PROCEDURE — 85025 COMPLETE CBC W/AUTO DIFF WBC: CPT | Performed by: HOSPITALIST

## 2022-02-20 PROCEDURE — 80048 BASIC METABOLIC PNL TOTAL CA: CPT | Performed by: HOSPITALIST

## 2022-02-20 PROCEDURE — 25010000002 VANCOMYCIN 10 G RECONSTITUTED SOLUTION: Performed by: HOSPITALIST

## 2022-02-20 PROCEDURE — 25010000002 HYDROMORPHONE 1 MG/ML SOLUTION: Performed by: HOSPITALIST

## 2022-02-20 RX ADMIN — NITROGLYCERIN 0.5 INCH: 20 OINTMENT TOPICAL at 06:05

## 2022-02-20 RX ADMIN — HYDROMORPHONE HYDROCHLORIDE 1 MG: 1 INJECTION, SOLUTION INTRAMUSCULAR; INTRAVENOUS; SUBCUTANEOUS at 21:45

## 2022-02-20 RX ADMIN — HYDROMORPHONE HYDROCHLORIDE 1 MG: 1 INJECTION, SOLUTION INTRAMUSCULAR; INTRAVENOUS; SUBCUTANEOUS at 03:16

## 2022-02-20 RX ADMIN — Medication: at 09:13

## 2022-02-20 RX ADMIN — HEPARIN SODIUM 27.4 UNITS/KG/HR: 10000 INJECTION INTRAVENOUS; SUBCUTANEOUS at 15:31

## 2022-02-20 RX ADMIN — VANCOMYCIN HYDROCHLORIDE 1500 MG: 10 INJECTION, POWDER, LYOPHILIZED, FOR SOLUTION INTRAVENOUS at 09:47

## 2022-02-20 RX ADMIN — SODIUM CHLORIDE, PRESERVATIVE FREE 10 ML: 5 INJECTION INTRAVENOUS at 21:47

## 2022-02-20 RX ADMIN — HEPARIN SODIUM 4200 UNITS: 5000 INJECTION INTRAVENOUS; SUBCUTANEOUS at 15:32

## 2022-02-20 RX ADMIN — LISINOPRIL 40 MG: 40 TABLET ORAL at 08:09

## 2022-02-20 RX ADMIN — VANCOMYCIN HYDROCHLORIDE 1500 MG: 10 INJECTION, POWDER, LYOPHILIZED, FOR SOLUTION INTRAVENOUS at 21:55

## 2022-02-20 RX ADMIN — HEPARIN SODIUM 4200 UNITS: 5000 INJECTION INTRAVENOUS; SUBCUTANEOUS at 02:50

## 2022-02-20 RX ADMIN — HYDROMORPHONE HYDROCHLORIDE 1 MG: 1 INJECTION, SOLUTION INTRAMUSCULAR; INTRAVENOUS; SUBCUTANEOUS at 14:05

## 2022-02-20 RX ADMIN — HEPARIN SODIUM 25.4 UNITS/KG/HR: 10000 INJECTION INTRAVENOUS; SUBCUTANEOUS at 03:04

## 2022-02-20 RX ADMIN — HYDROMORPHONE HYDROCHLORIDE 1 MG: 1 INJECTION, SOLUTION INTRAMUSCULAR; INTRAVENOUS; SUBCUTANEOUS at 08:15

## 2022-02-20 RX ADMIN — SODIUM CHLORIDE, PRESERVATIVE FREE 10 ML: 5 INJECTION INTRAVENOUS at 08:51

## 2022-02-20 RX ADMIN — NITROGLYCERIN 0.5 INCH: 20 OINTMENT TOPICAL at 11:01

## 2022-02-20 RX ADMIN — NITROGLYCERIN 0.5 INCH: 20 OINTMENT TOPICAL at 00:48

## 2022-02-20 RX ADMIN — NITROGLYCERIN 0.5 INCH: 20 OINTMENT TOPICAL at 17:00

## 2022-02-20 RX ADMIN — NITROGLYCERIN 0.5 INCH: 20 OINTMENT TOPICAL at 23:56

## 2022-02-21 ENCOUNTER — APPOINTMENT (OUTPATIENT)
Dept: ULTRASOUND IMAGING | Facility: HOSPITAL | Age: 52
End: 2022-02-21

## 2022-02-21 DIAGNOSIS — I73.9 PVD (PERIPHERAL VASCULAR DISEASE): Primary | ICD-10-CM

## 2022-02-21 LAB
ANION GAP SERPL CALCULATED.3IONS-SCNC: 12 MMOL/L (ref 5–15)
APTT PPP: 83.6 SECONDS (ref 20–40.3)
APTT PPP: 95 SECONDS (ref 20–40.3)
BASOPHILS # BLD AUTO: 0.04 10*3/MM3 (ref 0–0.2)
BASOPHILS NFR BLD AUTO: 0.8 % (ref 0–1.5)
BUN SERPL-MCNC: 7 MG/DL (ref 6–20)
BUN/CREAT SERPL: 9.2 (ref 7–25)
CALCIUM SPEC-SCNC: 8.5 MG/DL (ref 8.6–10.5)
CHLORIDE SERPL-SCNC: 101 MMOL/L (ref 98–107)
CO2 SERPL-SCNC: 22 MMOL/L (ref 22–29)
CREAT SERPL-MCNC: 0.76 MG/DL (ref 0.76–1.27)
DEPRECATED RDW RBC AUTO: 47.8 FL (ref 37–54)
EOSINOPHIL # BLD AUTO: 0.15 10*3/MM3 (ref 0–0.4)
EOSINOPHIL NFR BLD AUTO: 3 % (ref 0.3–6.2)
ERYTHROCYTE [DISTWIDTH] IN BLOOD BY AUTOMATED COUNT: 13.6 % (ref 12.3–15.4)
F5 GENE MUT ANL BLD/T: NORMAL
FACTOR II, DNA ANALYSIS: NORMAL
GFR SERPL CREATININE-BSD FRML MDRD: 108 ML/MIN/1.73
GLUCOSE SERPL-MCNC: 110 MG/DL (ref 65–99)
HCT VFR BLD AUTO: 53.7 % (ref 37.5–51)
HGB BLD-MCNC: 18.5 G/DL (ref 13–17.7)
IMM GRANULOCYTES # BLD AUTO: 0.01 10*3/MM3 (ref 0–0.05)
IMM GRANULOCYTES NFR BLD AUTO: 0.2 % (ref 0–0.5)
LYMPHOCYTES # BLD AUTO: 1.03 10*3/MM3 (ref 0.7–3.1)
LYMPHOCYTES NFR BLD AUTO: 20.5 % (ref 19.6–45.3)
MCH RBC QN AUTO: 32.7 PG (ref 26.6–33)
MCHC RBC AUTO-ENTMCNC: 34.5 G/DL (ref 31.5–35.7)
MCV RBC AUTO: 95 FL (ref 79–97)
MONOCYTES # BLD AUTO: 0.57 10*3/MM3 (ref 0.1–0.9)
MONOCYTES NFR BLD AUTO: 11.4 % (ref 5–12)
NEUTROPHILS NFR BLD AUTO: 3.22 10*3/MM3 (ref 1.7–7)
NEUTROPHILS NFR BLD AUTO: 64.1 % (ref 42.7–76)
NRBC BLD AUTO-RTO: 0 /100 WBC (ref 0–0.2)
PLATELET # BLD AUTO: 98 10*3/MM3 (ref 140–450)
PMV BLD AUTO: 11.2 FL (ref 6–12)
POTASSIUM SERPL-SCNC: 4 MMOL/L (ref 3.5–5.2)
RBC # BLD AUTO: 5.65 10*6/MM3 (ref 4.14–5.8)
SODIUM SERPL-SCNC: 135 MMOL/L (ref 136–145)
WBC NRBC COR # BLD: 5.02 10*3/MM3 (ref 3.4–10.8)

## 2022-02-21 PROCEDURE — 80048 BASIC METABOLIC PNL TOTAL CA: CPT | Performed by: HOSPITALIST

## 2022-02-21 PROCEDURE — C2623 CATH, TRANSLUMIN, DRUG-COAT: HCPCS | Performed by: THORACIC SURGERY (CARDIOTHORACIC VASCULAR SURGERY)

## 2022-02-21 PROCEDURE — B4101ZZ FLUOROSCOPY OF ABDOMINAL AORTA USING LOW OSMOLAR CONTRAST: ICD-10-PCS | Performed by: THORACIC SURGERY (CARDIOTHORACIC VASCULAR SURGERY)

## 2022-02-21 PROCEDURE — 25010000002 HEPARIN (PORCINE) PER 1000 UNITS: Performed by: FAMILY MEDICINE

## 2022-02-21 PROCEDURE — 75716 ARTERY X-RAYS ARMS/LEGS: CPT | Performed by: THORACIC SURGERY (CARDIOTHORACIC VASCULAR SURGERY)

## 2022-02-21 PROCEDURE — 04FK3ZZ FRAGMENTATION OF RIGHT FEMORAL ARTERY, PERCUTANEOUS APPROACH: ICD-10-PCS | Performed by: THORACIC SURGERY (CARDIOTHORACIC VASCULAR SURGERY)

## 2022-02-21 PROCEDURE — C1725 CATH, TRANSLUMIN NON-LASER: HCPCS | Performed by: THORACIC SURGERY (CARDIOTHORACIC VASCULAR SURGERY)

## 2022-02-21 PROCEDURE — C1887 CATHETER, GUIDING: HCPCS | Performed by: THORACIC SURGERY (CARDIOTHORACIC VASCULAR SURGERY)

## 2022-02-21 PROCEDURE — 76937 US GUIDE VASCULAR ACCESS: CPT

## 2022-02-21 PROCEDURE — C1894 INTRO/SHEATH, NON-LASER: HCPCS | Performed by: THORACIC SURGERY (CARDIOTHORACIC VASCULAR SURGERY)

## 2022-02-21 PROCEDURE — 25010000002 ALTEPLASE 2 MG RECONSTITUTED SOLUTION: Performed by: THORACIC SURGERY (CARDIOTHORACIC VASCULAR SURGERY)

## 2022-02-21 PROCEDURE — 25010000002 FENTANYL CITRATE (PF) 50 MCG/ML SOLUTION: Performed by: THORACIC SURGERY (CARDIOTHORACIC VASCULAR SURGERY)

## 2022-02-21 PROCEDURE — 25010000002 VANCOMYCIN 10 G RECONSTITUTED SOLUTION: Performed by: THORACIC SURGERY (CARDIOTHORACIC VASCULAR SURGERY)

## 2022-02-21 PROCEDURE — C1760 CLOSURE DEV, VASC: HCPCS | Performed by: THORACIC SURGERY (CARDIOTHORACIC VASCULAR SURGERY)

## 2022-02-21 PROCEDURE — 25010000002 IOPAMIDOL 61 % SOLUTION: Performed by: THORACIC SURGERY (CARDIOTHORACIC VASCULAR SURGERY)

## 2022-02-21 PROCEDURE — 37226 PR REVSC OPN/PRQ FEM/POP W/STNT/ANGIOP SM VSL: CPT | Performed by: THORACIC SURGERY (CARDIOTHORACIC VASCULAR SURGERY)

## 2022-02-21 PROCEDURE — 25010000002 MIDAZOLAM PER 1 MG: Performed by: THORACIC SURGERY (CARDIOTHORACIC VASCULAR SURGERY)

## 2022-02-21 PROCEDURE — 25010000002 HEPARIN (PORCINE) PER 1000 UNITS: Performed by: THORACIC SURGERY (CARDIOTHORACIC VASCULAR SURGERY)

## 2022-02-21 PROCEDURE — C1769 GUIDE WIRE: HCPCS | Performed by: THORACIC SURGERY (CARDIOTHORACIC VASCULAR SURGERY)

## 2022-02-21 PROCEDURE — 25010000002 HYDROMORPHONE 1 MG/ML SOLUTION: Performed by: THORACIC SURGERY (CARDIOTHORACIC VASCULAR SURGERY)

## 2022-02-21 PROCEDURE — 85730 THROMBOPLASTIN TIME PARTIAL: CPT | Performed by: THORACIC SURGERY (CARDIOTHORACIC VASCULAR SURGERY)

## 2022-02-21 PROCEDURE — 75625 CONTRAST EXAM ABDOMINL AORTA: CPT | Performed by: THORACIC SURGERY (CARDIOTHORACIC VASCULAR SURGERY)

## 2022-02-21 PROCEDURE — 85025 COMPLETE CBC W/AUTO DIFF WBC: CPT | Performed by: HOSPITALIST

## 2022-02-21 PROCEDURE — 85730 THROMBOPLASTIN TIME PARTIAL: CPT | Performed by: FAMILY MEDICINE

## 2022-02-21 PROCEDURE — B41G1ZZ FLUOROSCOPY OF LEFT LOWER EXTREMITY ARTERIES USING LOW OSMOLAR CONTRAST: ICD-10-PCS | Performed by: THORACIC SURGERY (CARDIOTHORACIC VASCULAR SURGERY)

## 2022-02-21 PROCEDURE — 25010000002 HYDROMORPHONE 1 MG/ML SOLUTION: Performed by: HOSPITALIST

## 2022-02-21 PROCEDURE — 047K3Z1 DILATION OF RIGHT FEMORAL ARTERY USING DRUG-COATED BALLOON, PERCUTANEOUS APPROACH: ICD-10-PCS | Performed by: THORACIC SURGERY (CARDIOTHORACIC VASCULAR SURGERY)

## 2022-02-21 PROCEDURE — 3E05317 INTRODUCTION OF OTHER THROMBOLYTIC INTO PERIPHERAL ARTERY, PERCUTANEOUS APPROACH: ICD-10-PCS | Performed by: THORACIC SURGERY (CARDIOTHORACIC VASCULAR SURGERY)

## 2022-02-21 PROCEDURE — 36415 COLL VENOUS BLD VENIPUNCTURE: CPT | Performed by: HOSPITALIST

## 2022-02-21 PROCEDURE — B41F1ZZ FLUOROSCOPY OF RIGHT LOWER EXTREMITY ARTERIES USING LOW OSMOLAR CONTRAST: ICD-10-PCS | Performed by: THORACIC SURGERY (CARDIOTHORACIC VASCULAR SURGERY)

## 2022-02-21 RX ORDER — MIDAZOLAM HYDROCHLORIDE 1 MG/ML
INJECTION INTRAMUSCULAR; INTRAVENOUS AS NEEDED
Status: DISCONTINUED | OUTPATIENT
Start: 2022-02-21 | End: 2022-02-21 | Stop reason: HOSPADM

## 2022-02-21 RX ORDER — MIDAZOLAM HYDROCHLORIDE 1 MG/ML
INJECTION INTRAMUSCULAR; INTRAVENOUS
Status: DISPENSED
Start: 2022-02-21 | End: 2022-02-21

## 2022-02-21 RX ORDER — FENTANYL CITRATE 50 UG/ML
INJECTION, SOLUTION INTRAMUSCULAR; INTRAVENOUS AS NEEDED
Status: DISCONTINUED | OUTPATIENT
Start: 2022-02-21 | End: 2022-02-21 | Stop reason: HOSPADM

## 2022-02-21 RX ORDER — CLOPIDOGREL BISULFATE 75 MG/1
75 TABLET ORAL DAILY
Status: DISCONTINUED | OUTPATIENT
Start: 2022-02-22 | End: 2022-02-22 | Stop reason: HOSPADM

## 2022-02-21 RX ORDER — FENTANYL CITRATE 50 UG/ML
INJECTION, SOLUTION INTRAMUSCULAR; INTRAVENOUS
Status: DISPENSED
Start: 2022-02-21 | End: 2022-02-21

## 2022-02-21 RX ORDER — ATORVASTATIN CALCIUM 40 MG/1
40 TABLET, FILM COATED ORAL NIGHTLY
Status: DISCONTINUED | OUTPATIENT
Start: 2022-02-21 | End: 2022-02-22 | Stop reason: HOSPADM

## 2022-02-21 RX ORDER — CLOPIDOGREL BISULFATE 75 MG/1
150 TABLET ORAL DAILY
Status: DISCONTINUED | OUTPATIENT
Start: 2022-02-21 | End: 2022-02-21

## 2022-02-21 RX ORDER — HEPARIN SODIUM 1000 [USP'U]/ML
INJECTION, SOLUTION INTRAVENOUS; SUBCUTANEOUS AS NEEDED
Status: DISCONTINUED | OUTPATIENT
Start: 2022-02-21 | End: 2022-02-21 | Stop reason: HOSPADM

## 2022-02-21 RX ORDER — LIDOCAINE HYDROCHLORIDE 20 MG/ML
INJECTION, SOLUTION INFILTRATION; PERINEURAL
Status: COMPLETED
Start: 2022-02-21 | End: 2022-02-21

## 2022-02-21 RX ORDER — ACETAMINOPHEN 325 MG/1
650 TABLET ORAL EVERY 4 HOURS PRN
Status: DISCONTINUED | OUTPATIENT
Start: 2022-02-21 | End: 2022-02-22 | Stop reason: HOSPADM

## 2022-02-21 RX ORDER — HEPARIN SODIUM 1000 [USP'U]/ML
INJECTION, SOLUTION INTRAVENOUS; SUBCUTANEOUS
Status: DISPENSED
Start: 2022-02-21 | End: 2022-02-21

## 2022-02-21 RX ADMIN — HYDROMORPHONE HYDROCHLORIDE 1 MG: 1 INJECTION, SOLUTION INTRAMUSCULAR; INTRAVENOUS; SUBCUTANEOUS at 02:54

## 2022-02-21 RX ADMIN — ATORVASTATIN CALCIUM 40 MG: 40 TABLET, FILM COATED ORAL at 12:51

## 2022-02-21 RX ADMIN — SODIUM CHLORIDE, PRESERVATIVE FREE 10 ML: 5 INJECTION INTRAVENOUS at 21:32

## 2022-02-21 RX ADMIN — HYDROMORPHONE HYDROCHLORIDE 1 MG: 1 INJECTION, SOLUTION INTRAMUSCULAR; INTRAVENOUS; SUBCUTANEOUS at 17:03

## 2022-02-21 RX ADMIN — NITROGLYCERIN 0.5 INCH: 20 OINTMENT TOPICAL at 06:14

## 2022-02-21 RX ADMIN — NITROGLYCERIN 0.5 INCH: 20 OINTMENT TOPICAL at 17:03

## 2022-02-21 RX ADMIN — NITROGLYCERIN 0.5 INCH: 20 OINTMENT TOPICAL at 23:46

## 2022-02-21 RX ADMIN — VANCOMYCIN HYDROCHLORIDE 1500 MG: 10 INJECTION, POWDER, LYOPHILIZED, FOR SOLUTION INTRAVENOUS at 14:16

## 2022-02-21 RX ADMIN — RIVAROXABAN 20 MG: 10 TABLET, FILM COATED ORAL at 12:52

## 2022-02-21 RX ADMIN — HEPARIN SODIUM 25.4 UNITS/KG/HR: 10000 INJECTION INTRAVENOUS; SUBCUTANEOUS at 01:48

## 2022-02-21 RX ADMIN — HYDROMORPHONE HYDROCHLORIDE 1 MG: 1 INJECTION, SOLUTION INTRAMUSCULAR; INTRAVENOUS; SUBCUTANEOUS at 10:19

## 2022-02-21 RX ADMIN — CLOPIDOGREL BISULFATE 150 MG: 75 TABLET ORAL at 12:51

## 2022-02-21 RX ADMIN — HYDROMORPHONE HYDROCHLORIDE 1 MG: 1 INJECTION, SOLUTION INTRAMUSCULAR; INTRAVENOUS; SUBCUTANEOUS at 21:31

## 2022-02-21 RX ADMIN — LIDOCAINE HYDROCHLORIDE: 20 INJECTION, SOLUTION INFILTRATION; PERINEURAL at 08:27

## 2022-02-21 RX ADMIN — LISINOPRIL 40 MG: 40 TABLET ORAL at 12:56

## 2022-02-21 RX ADMIN — NITROGLYCERIN 0.5 INCH: 20 OINTMENT TOPICAL at 12:51

## 2022-02-22 VITALS
SYSTOLIC BLOOD PRESSURE: 140 MMHG | WEIGHT: 231.48 LBS | OXYGEN SATURATION: 94 % | RESPIRATION RATE: 18 BRPM | TEMPERATURE: 98.2 F | HEART RATE: 83 BPM | BODY MASS INDEX: 31.35 KG/M2 | DIASTOLIC BLOOD PRESSURE: 83 MMHG | HEIGHT: 72 IN

## 2022-02-22 LAB
BASOPHILS # BLD AUTO: 0.04 10*3/MM3 (ref 0–0.2)
BASOPHILS NFR BLD AUTO: 0.9 % (ref 0–1.5)
DEPRECATED RDW RBC AUTO: 49 FL (ref 37–54)
EOSINOPHIL # BLD AUTO: 0.14 10*3/MM3 (ref 0–0.4)
EOSINOPHIL NFR BLD AUTO: 3 % (ref 0.3–6.2)
ERYTHROCYTE [DISTWIDTH] IN BLOOD BY AUTOMATED COUNT: 13.6 % (ref 12.3–15.4)
HCT VFR BLD AUTO: 52.6 % (ref 37.5–51)
HGB BLD-MCNC: 18.4 G/DL (ref 13–17.7)
HOLD SPECIMEN: NORMAL
IMM GRANULOCYTES # BLD AUTO: 0.02 10*3/MM3 (ref 0–0.05)
IMM GRANULOCYTES NFR BLD AUTO: 0.4 % (ref 0–0.5)
LYMPHOCYTES # BLD AUTO: 0.77 10*3/MM3 (ref 0.7–3.1)
LYMPHOCYTES NFR BLD AUTO: 16.5 % (ref 19.6–45.3)
MCH RBC QN AUTO: 33.9 PG (ref 26.6–33)
MCHC RBC AUTO-ENTMCNC: 35 G/DL (ref 31.5–35.7)
MCV RBC AUTO: 97 FL (ref 79–97)
MONOCYTES # BLD AUTO: 0.63 10*3/MM3 (ref 0.1–0.9)
MONOCYTES NFR BLD AUTO: 13.5 % (ref 5–12)
NEUTROPHILS NFR BLD AUTO: 3.08 10*3/MM3 (ref 1.7–7)
NEUTROPHILS NFR BLD AUTO: 65.7 % (ref 42.7–76)
NRBC BLD AUTO-RTO: 0 /100 WBC (ref 0–0.2)
PLATELET # BLD AUTO: 100 10*3/MM3 (ref 140–450)
PMV BLD AUTO: 11.5 FL (ref 6–12)
RBC # BLD AUTO: 5.42 10*6/MM3 (ref 4.14–5.8)
WBC NRBC COR # BLD: 4.68 10*3/MM3 (ref 3.4–10.8)

## 2022-02-22 PROCEDURE — 25010000002 VANCOMYCIN 10 G RECONSTITUTED SOLUTION: Performed by: THORACIC SURGERY (CARDIOTHORACIC VASCULAR SURGERY)

## 2022-02-22 PROCEDURE — 85025 COMPLETE CBC W/AUTO DIFF WBC: CPT | Performed by: FAMILY MEDICINE

## 2022-02-22 PROCEDURE — 99233 SBSQ HOSP IP/OBS HIGH 50: CPT | Performed by: NURSE PRACTITIONER

## 2022-02-22 PROCEDURE — 25010000002 HYDROMORPHONE 1 MG/ML SOLUTION: Performed by: THORACIC SURGERY (CARDIOTHORACIC VASCULAR SURGERY)

## 2022-02-22 RX ORDER — ATORVASTATIN CALCIUM 40 MG/1
40 TABLET, FILM COATED ORAL NIGHTLY
Qty: 30 TABLET | Refills: 0 | Status: SHIPPED | OUTPATIENT
Start: 2022-02-22

## 2022-02-22 RX ORDER — OXYCODONE AND ACETAMINOPHEN 7.5; 325 MG/1; MG/1
1 TABLET ORAL EVERY 8 HOURS PRN
Qty: 9 TABLET | Refills: 0 | Status: SHIPPED | OUTPATIENT
Start: 2022-02-22 | End: 2022-03-07 | Stop reason: HOSPADM

## 2022-02-22 RX ORDER — CLOPIDOGREL BISULFATE 75 MG/1
75 TABLET ORAL DAILY
Qty: 30 TABLET | Refills: 0 | Status: SHIPPED | OUTPATIENT
Start: 2022-02-23

## 2022-02-22 RX ADMIN — HYDROMORPHONE HYDROCHLORIDE 1 MG: 1 INJECTION, SOLUTION INTRAMUSCULAR; INTRAVENOUS; SUBCUTANEOUS at 02:40

## 2022-02-22 RX ADMIN — HYDROMORPHONE HYDROCHLORIDE 1 MG: 1 INJECTION, SOLUTION INTRAMUSCULAR; INTRAVENOUS; SUBCUTANEOUS at 08:27

## 2022-02-22 RX ADMIN — RIVAROXABAN 20 MG: 10 TABLET, FILM COATED ORAL at 08:26

## 2022-02-22 RX ADMIN — VANCOMYCIN HYDROCHLORIDE 1500 MG: 10 INJECTION, POWDER, LYOPHILIZED, FOR SOLUTION INTRAVENOUS at 02:35

## 2022-02-22 RX ADMIN — CLOPIDOGREL BISULFATE 75 MG: 75 TABLET ORAL at 08:27

## 2022-02-22 RX ADMIN — SODIUM CHLORIDE, PRESERVATIVE FREE 10 ML: 5 INJECTION INTRAVENOUS at 08:27

## 2022-02-22 RX ADMIN — LISINOPRIL 40 MG: 40 TABLET ORAL at 08:26

## 2022-02-22 RX ADMIN — NITROGLYCERIN 0.5 INCH: 20 OINTMENT TOPICAL at 05:31

## 2022-02-23 ENCOUNTER — READMISSION MANAGEMENT (OUTPATIENT)
Dept: CALL CENTER | Facility: HOSPITAL | Age: 52
End: 2022-02-23

## 2022-02-23 LAB
BACTERIA SPEC AEROBE CULT: NORMAL
BACTERIA SPEC AEROBE CULT: NORMAL

## 2022-02-23 NOTE — OUTREACH NOTE
Prep Survey      Responses   [facility] facility patient discharged from? [place]   Is LACE score < 7 ? No   Emergency Room discharge w/ pulse ox? No   Eligibility Readm Mgmt   Discharge diagnosis PVD (peripheral vascular disease   Does the patient have one of the following disease processes/diagnoses(primary or secondary)? Other   Does the patient have Home health ordered? No   Is there a DME ordered? No   Medication alerts for this patient Plavix    Prep survey completed? Yes          Zoraida Lozano RN

## 2022-02-28 ENCOUNTER — READMISSION MANAGEMENT (OUTPATIENT)
Dept: CALL CENTER | Facility: HOSPITAL | Age: 52
End: 2022-02-28

## 2022-02-28 NOTE — OUTREACH NOTE
"Medical Week 1 Survey      Responses   [facility] patient discharged from? [place]   Does the patient have one of the following disease processes/diagnoses(primary or secondary)? Other   Week 1 attempt successful? Yes   Call start time 1251   Call end time 1257   Discharge diagnosis PVD (peripheral vascular disease)   Person spoke with today (if not patient) and relationship Carlos-marissa parent   Meds reviewed with patient/caregiver? Yes   Is the patient having any side effects they believe may be caused by any medication additions or changes? No   Does the patient have all medications ordered at discharge? Yes   Is the patient taking all medications as directed (includes completed medication regime)? Yes   Comments regarding appointments Appt with vascular surgery is on 3/7/22 however his PCP wants him to move that appt up as she didn't like the look of his foot.   Does the patient have a primary care provider?  Yes   Does the patient have an appointment with their PCP within 7 days of discharge? Yes   Comments regarding PCP 2/25/22   Has the patient kept scheduled appointments due by today? Yes   Psychosocial issues? No   Did the patient receive a copy of their discharge instructions? Yes   Nursing interventions Reviewed instructions with patient   What is the patient's perception of their health status since discharge? Improving  [Carlos reports, \"as of Saturday is was ok but I doubt it. He told me he was going back to work today (2/28/22).\" ]   Is the patient/caregiver able to teach back signs and symptoms related to disease process for when to call PCP? Yes   Is the patient/caregiver able to teach back signs and symptoms related to disease process for when to call 911? Yes   Is the patient/caregiver able to teach back the hierarchy of who to call/visit for symptoms/problems? PCP, Specialist, Home health nurse, Urgent Care, ED, 911 Yes   If the patient is a current smoker, are they able to teach back " resources for cessation? --  [smoker]   Week 1 call completed? Yes          Joana Moreno RN

## 2022-03-02 ENCOUNTER — APPOINTMENT (OUTPATIENT)
Dept: GENERAL RADIOLOGY | Facility: HOSPITAL | Age: 52
DRG: 505 | End: 2022-03-02
Payer: MEDICAID

## 2022-03-02 ENCOUNTER — READMISSION MANAGEMENT (OUTPATIENT)
Dept: CALL CENTER | Facility: HOSPITAL | Age: 52
End: 2022-03-02

## 2022-03-02 ENCOUNTER — OFFICE VISIT (OUTPATIENT)
Dept: CARDIAC SURGERY | Facility: CLINIC | Age: 52
End: 2022-03-02

## 2022-03-02 ENCOUNTER — HOSPITAL ENCOUNTER (INPATIENT)
Facility: HOSPITAL | Age: 52
LOS: 3 days | Discharge: HOME | DRG: 505 | End: 2022-03-07
Attending: FAMILY MEDICINE | Admitting: PODIATRIST
Payer: MEDICAID

## 2022-03-02 VITALS
WEIGHT: 218 LBS | BODY MASS INDEX: 29.53 KG/M2 | DIASTOLIC BLOOD PRESSURE: 74 MMHG | HEART RATE: 80 BPM | HEIGHT: 72 IN | SYSTOLIC BLOOD PRESSURE: 106 MMHG | OXYGEN SATURATION: 98 %

## 2022-03-02 DIAGNOSIS — I99.8 ISCHEMIC TOE: ICD-10-CM

## 2022-03-02 DIAGNOSIS — R00.0 RAPID HEART RATE: ICD-10-CM

## 2022-03-02 DIAGNOSIS — F17.218 NICOTINE DEPENDENCE, CIGARETTES, WITH OTHER NICOTINE-INDUCED DISORDERS: ICD-10-CM

## 2022-03-02 DIAGNOSIS — I48.91 ATRIAL FIBRILLATION WITH RVR: Primary | ICD-10-CM

## 2022-03-02 DIAGNOSIS — I73.9 PVD (PERIPHERAL VASCULAR DISEASE): Primary | ICD-10-CM

## 2022-03-02 LAB
ALBUMIN SERPL-MCNC: 4.1 G/DL (ref 3.5–5.2)
ALBUMIN/GLOB SERPL: 1.3 G/DL
ALP SERPL-CCNC: 122 U/L (ref 39–117)
ALT SERPL W P-5'-P-CCNC: 13 U/L (ref 1–41)
ANION GAP SERPL CALCULATED.3IONS-SCNC: 13 MMOL/L (ref 5–15)
AST SERPL-CCNC: 21 U/L (ref 1–40)
BASOPHILS # BLD AUTO: 0.12 10*3/MM3 (ref 0–0.2)
BASOPHILS NFR BLD AUTO: 1.2 % (ref 0–1.5)
BILIRUB SERPL-MCNC: 1 MG/DL (ref 0–1.2)
BUN SERPL-MCNC: 7 MG/DL (ref 6–20)
BUN/CREAT SERPL: 8.9 (ref 7–25)
CALCIUM SPEC-SCNC: 9.7 MG/DL (ref 8.6–10.5)
CHLORIDE SERPL-SCNC: 97 MMOL/L (ref 98–107)
CO2 SERPL-SCNC: 23 MMOL/L (ref 22–29)
CREAT SERPL-MCNC: 0.79 MG/DL (ref 0.76–1.27)
CRP SERPL-MCNC: 0.52 MG/DL (ref 0–0.5)
DEPRECATED RDW RBC AUTO: 45.1 FL (ref 37–54)
EGFRCR SERPLBLD CKD-EPI 2021: 107.6 ML/MIN/1.73
EOSINOPHIL # BLD AUTO: 0.17 10*3/MM3 (ref 0–0.4)
EOSINOPHIL NFR BLD AUTO: 1.7 % (ref 0.3–6.2)
ERYTHROCYTE [DISTWIDTH] IN BLOOD BY AUTOMATED COUNT: 13.5 % (ref 12.3–15.4)
FLUAV RNA RESP QL NAA+PROBE: NOT DETECTED
FLUBV RNA RESP QL NAA+PROBE: NOT DETECTED
GLOBULIN UR ELPH-MCNC: 3.1 GM/DL
GLUCOSE SERPL-MCNC: 121 MG/DL (ref 65–99)
HCT VFR BLD AUTO: 57.8 % (ref 37.5–51)
HGB BLD-MCNC: 20.7 G/DL (ref 13–17.7)
HOLD SPECIMEN: NORMAL
HOLD SPECIMEN: NORMAL
IMM GRANULOCYTES # BLD AUTO: 0.05 10*3/MM3 (ref 0–0.05)
IMM GRANULOCYTES NFR BLD AUTO: 0.5 % (ref 0–0.5)
LYMPHOCYTES # BLD AUTO: 1.46 10*3/MM3 (ref 0.7–3.1)
LYMPHOCYTES NFR BLD AUTO: 14.9 % (ref 19.6–45.3)
MAGNESIUM SERPL-MCNC: 2.3 MG/DL (ref 1.6–2.6)
MCH RBC QN AUTO: 33.2 PG (ref 26.6–33)
MCHC RBC AUTO-ENTMCNC: 35.8 G/DL (ref 31.5–35.7)
MCV RBC AUTO: 92.8 FL (ref 79–97)
MONOCYTES # BLD AUTO: 0.87 10*3/MM3 (ref 0.1–0.9)
MONOCYTES NFR BLD AUTO: 8.9 % (ref 5–12)
NEUTROPHILS NFR BLD AUTO: 7.13 10*3/MM3 (ref 1.7–7)
NEUTROPHILS NFR BLD AUTO: 72.8 % (ref 42.7–76)
NRBC BLD AUTO-RTO: 0 /100 WBC (ref 0–0.2)
PLATELET # BLD AUTO: 215 10*3/MM3 (ref 140–450)
PMV BLD AUTO: 11.3 FL (ref 6–12)
POTASSIUM SERPL-SCNC: 4.3 MMOL/L (ref 3.5–5.2)
PROT SERPL-MCNC: 7.2 G/DL (ref 6–8.5)
RBC # BLD AUTO: 6.23 10*6/MM3 (ref 4.14–5.8)
SARS-COV-2 RNA RESP QL NAA+PROBE: NOT DETECTED
SODIUM SERPL-SCNC: 133 MMOL/L (ref 136–145)
TROPONIN T SERPL-MCNC: <0.01 NG/ML (ref 0–0.03)
WBC NRBC COR # BLD: 9.8 10*3/MM3 (ref 3.4–10.8)
WHOLE BLOOD HOLD SPECIMEN: NORMAL
WHOLE BLOOD HOLD SPECIMEN: NORMAL

## 2022-03-02 PROCEDURE — G0378 HOSPITAL OBSERVATION PER HR: HCPCS

## 2022-03-02 PROCEDURE — 71045 X-RAY EXAM CHEST 1 VIEW: CPT

## 2022-03-02 PROCEDURE — 99285 EMERGENCY DEPT VISIT HI MDM: CPT

## 2022-03-02 PROCEDURE — 93010 ELECTROCARDIOGRAM REPORT: CPT | Performed by: INTERNAL MEDICINE

## 2022-03-02 PROCEDURE — 80053 COMPREHEN METABOLIC PANEL: CPT | Performed by: FAMILY MEDICINE

## 2022-03-02 PROCEDURE — 25010000002 MORPHINE PER 10 MG: Performed by: EMERGENCY MEDICINE

## 2022-03-02 PROCEDURE — 85025 COMPLETE CBC W/AUTO DIFF WBC: CPT

## 2022-03-02 PROCEDURE — 83735 ASSAY OF MAGNESIUM: CPT | Performed by: FAMILY MEDICINE

## 2022-03-02 PROCEDURE — 99214 OFFICE O/P EST MOD 30 MIN: CPT | Performed by: NURSE PRACTITIONER

## 2022-03-02 PROCEDURE — 25010000002 LORAZEPAM PER 2 MG: Performed by: FAMILY MEDICINE

## 2022-03-02 PROCEDURE — 84484 ASSAY OF TROPONIN QUANT: CPT | Performed by: FAMILY MEDICINE

## 2022-03-02 PROCEDURE — 93005 ELECTROCARDIOGRAM TRACING: CPT

## 2022-03-02 PROCEDURE — 86140 C-REACTIVE PROTEIN: CPT | Performed by: PHYSICIAN ASSISTANT

## 2022-03-02 PROCEDURE — 87636 SARSCOV2 & INF A&B AMP PRB: CPT | Performed by: PHYSICIAN ASSISTANT

## 2022-03-02 PROCEDURE — 25010000002 ONDANSETRON PER 1 MG: Performed by: PHYSICIAN ASSISTANT

## 2022-03-02 PROCEDURE — 93005 ELECTROCARDIOGRAM TRACING: CPT | Performed by: NURSE PRACTITIONER

## 2022-03-02 PROCEDURE — 93005 ELECTROCARDIOGRAM TRACING: CPT | Performed by: FAMILY MEDICINE

## 2022-03-02 RX ORDER — DILTIAZEM HYDROCHLORIDE 5 MG/ML
10 INJECTION INTRAVENOUS ONCE
Status: COMPLETED | OUTPATIENT
Start: 2022-03-02 | End: 2022-03-02

## 2022-03-02 RX ORDER — LORAZEPAM 0.5 MG/1
1 TABLET ORAL
Status: DISCONTINUED | OUTPATIENT
Start: 2022-03-02 | End: 2022-03-07 | Stop reason: HOSPADM

## 2022-03-02 RX ORDER — LORAZEPAM 2 MG/ML
2 INJECTION INTRAMUSCULAR
Status: DISCONTINUED | OUTPATIENT
Start: 2022-03-02 | End: 2022-03-07 | Stop reason: HOSPADM

## 2022-03-02 RX ORDER — ONDANSETRON 2 MG/ML
4 INJECTION INTRAMUSCULAR; INTRAVENOUS EVERY 6 HOURS PRN
Status: DISCONTINUED | OUTPATIENT
Start: 2022-03-02 | End: 2022-03-07 | Stop reason: HOSPADM

## 2022-03-02 RX ORDER — LORAZEPAM 2 MG/ML
1 INJECTION INTRAMUSCULAR EVERY 8 HOURS
Status: DISPENSED | OUTPATIENT
Start: 2022-03-03 | End: 2022-03-04

## 2022-03-02 RX ORDER — SODIUM CHLORIDE 0.9 % (FLUSH) 0.9 %
10 SYRINGE (ML) INJECTION AS NEEDED
Status: DISCONTINUED | OUTPATIENT
Start: 2022-03-02 | End: 2022-03-07 | Stop reason: HOSPADM

## 2022-03-02 RX ORDER — LORAZEPAM 2 MG/ML
1 INJECTION INTRAMUSCULAR
Status: DISCONTINUED | OUTPATIENT
Start: 2022-03-02 | End: 2022-03-07 | Stop reason: HOSPADM

## 2022-03-02 RX ORDER — ONDANSETRON 2 MG/ML
4 INJECTION INTRAMUSCULAR; INTRAVENOUS ONCE
Status: COMPLETED | OUTPATIENT
Start: 2022-03-02 | End: 2022-03-02

## 2022-03-02 RX ORDER — SODIUM CHLORIDE 0.9 % (FLUSH) 0.9 %
10 SYRINGE (ML) INJECTION EVERY 12 HOURS SCHEDULED
Status: DISCONTINUED | OUTPATIENT
Start: 2022-03-02 | End: 2022-03-07 | Stop reason: HOSPADM

## 2022-03-02 RX ORDER — LORAZEPAM 2 MG/ML
1 INJECTION INTRAMUSCULAR EVERY 6 HOURS
Status: COMPLETED | OUTPATIENT
Start: 2022-03-02 | End: 2022-03-03

## 2022-03-02 RX ORDER — LORAZEPAM 2 MG/1
2 TABLET ORAL
Status: DISCONTINUED | OUTPATIENT
Start: 2022-03-02 | End: 2022-03-07 | Stop reason: HOSPADM

## 2022-03-02 RX ADMIN — MORPHINE SULFATE 4 MG: 4 INJECTION, SOLUTION INTRAMUSCULAR; INTRAVENOUS at 18:24

## 2022-03-02 RX ADMIN — DILTIAZEM HYDROCHLORIDE 10 MG: 5 INJECTION INTRAVENOUS at 17:11

## 2022-03-02 RX ADMIN — SODIUM CHLORIDE 5 MG/HR: 900 INJECTION, SOLUTION INTRAVENOUS at 18:27

## 2022-03-02 RX ADMIN — ONDANSETRON 4 MG: 2 INJECTION INTRAMUSCULAR; INTRAVENOUS at 18:24

## 2022-03-02 RX ADMIN — SODIUM CHLORIDE 1000 ML: 9 INJECTION, SOLUTION INTRAVENOUS at 17:10

## 2022-03-02 RX ADMIN — LORAZEPAM 1 MG: 2 INJECTION INTRAMUSCULAR; INTRAVENOUS at 20:19

## 2022-03-02 NOTE — PROGRESS NOTES
CVTS Office Progress Note     3/2/2022    William Alan  1970    Chief Complaint:    Chief Complaint   Patient presents with   • Peripheral Vascular Disease       HPI:      PCP:  Lakesha Dougherty MD     51 y.o. male with HTN(stable, increased risk stroke, rupture), Hyperlipidemia(stable, increased risk cardiovascular events), Obesity(uncontrolled, increased risk cardiovascular events) and Smoker(uncontrolled, increased risk cardiovascular events) , DVT/PTE(stable), PVD(new, increase risk cardiovascular events).  smokes 1 PPD.  Moderate discoloration pain LEFT leg x 3 months.  Hospitalized 9/2020 [facility] DVT/PTE.  Anticoagulated eliquis, no longer taking.  States blood clot in leg gone on recent ultrasound [facility].. mother grandmother with varicose veins, spider veins.  No TIA stroke amaurosis.  Reported to emergency department with new onset toe pain x4 days, denies claudication.  Toe pain was associated with cyanosis and ischemic changes.  He was anticoagulated with xarelto and revascularized with intervention to R SFA.  Returns today in follow up, worsening digital ischemia/pain, rapid heart rate.      9/2020 LEFT DVT, PTE ([facility]).  eliquis x 3 months  1/2021 ANDREW:  RIGHT 1.1 triphasic.   LEFT .97 triphasic.  2/2022 Arteriogram: Shockwave, PTA R SFA.  EDY open, PT and peroneal are occluded   3/2022 ANDREW: Right 1.04 triphasic fem, biphasic pop.  Left 1.07 triphasic  3/2022 R Art US: R SFA patent, EDY open.  PT/Peroneal are occluded which is known    The following portions of the patient's history were reviewed and updated as appropriate: allergies, current medications, past family history, past medical history, past social history, past surgical history and problem list.  Recent images independently reviewed.  Available laboratory values reviewed.    PMH:  Past Medical History:   Diagnosis Date   • HTN (hypertension)    • Hyperlipidemia    • Obesity      Past Surgical History:   Procedure Laterality Date   •  AMPUTATION DIGIT Left 2007    partial index finger 1st toe (MRSA)   • INTERVENTIONAL RADIOLOGY PROCEDURE N/A 2/21/2022    Procedure: Abdominal Aortagram with Runoff possible angioplasty stent atherectomy thrombolysis;  Surgeon: Massimo Haley MD;  Location: [facility];  Service: Interventional Radiology;  Laterality: N/A;     No family history on file.  Social History     Tobacco Use   • Smoking status: Current Every Day Smoker     Packs/day: 1.00     Types: Cigarettes   • Smokeless tobacco: Never Used   Substance Use Topics   • Alcohol use: Yes     Comment: daily, 6-12 beers daily   • Drug use: Never       ALLERGIES:  No Known Allergies      MEDICATIONS:    Current Outpatient Medications:   •  atorvastatin (LIPITOR) 40 MG tablet, Take 1 tablet by mouth Every Night., Disp: 30 tablet, Rfl: 0  •  clopidogrel (PLAVIX) 75 MG tablet, Take 1 tablet by mouth Daily., Disp: 30 tablet, Rfl: 0  •  lisinopril (PRINIVIL,ZESTRIL) 40 MG tablet, Take 40 mg by mouth Daily., Disp: , Rfl:   •  oxyCODONE-acetaminophen (Percocet) 7.5-325 MG per tablet, Take 1 tablet by mouth Every 8 (Eight) Hours As Needed for Moderate Pain  or Severe Pain ., Disp: 9 tablet, Rfl: 0  •  rivaroxaban (XARELTO) 20 MG tablet, Take 1 tablet by mouth Daily. Indications: Other - full anticoagulation, Disp: 30 tablet, Rfl: 0      Review of Systems   Constitutional: Negative for chills, decreased appetite, fever and weight loss.   HENT: Negative for congestion, nosebleeds and sore throat.    Eyes: Negative for blurred vision, visual disturbance and visual halos.   Cardiovascular: Positive for claudication, irregular heartbeat and leg swelling. Negative for chest pain and dyspnea on exertion.   Respiratory: Negative for cough, shortness of breath, sputum production and wheezing.    Endocrine: Negative for cold intolerance and polyuria.   Hematologic/Lymphatic: Negative for bleeding problem. Does not bruise/bleed easily.   Skin:  "Positive for color change, poor wound healing and unusual hair distribution. Negative for flushing and nail changes.   Musculoskeletal: Positive for arthritis, back pain and joint pain.   Gastrointestinal: Negative for bloating, abdominal pain, hematemesis, melena, nausea and vomiting.   Genitourinary: Negative for flank pain and hematuria.   Neurological: Negative for brief paralysis, difficulty with concentration, focal weakness, light-headedness, loss of balance, numbness, paresthesias and weakness.   Psychiatric/Behavioral: Negative for altered mental status, depression, substance abuse and suicidal ideas.   Allergic/Immunologic: Negative for hives and persistent infections.         Vitals:    03/02/22 1434   BP: 106/74   BP Location: Left arm   Patient Position: Sitting   Cuff Size: Adult   Pulse: 80   SpO2: 98%   Weight: 98.9 kg (218 lb)   Height: 182.9 cm (72\")     Physical Exam  Vitals and nursing note reviewed.   Constitutional:       Appearance: Normal appearance.   HENT:      Head: Normocephalic.      Nose: Nose normal.      Mouth/Throat:      Mouth: Mucous membranes are moist.   Eyes:      Pupils: Pupils are equal, round, and reactive to light.   Cardiovascular:      Rate and Rhythm: Tachycardia present. Rhythm irregular.      Pulses: Normal pulses.           Dorsalis pedis pulses are 2+ on the right side and 2+ on the left side.        Posterior tibial pulses are 2+ on the right side and 2+ on the left side.   Abdominal:      General: Abdomen is flat.      Palpations: Abdomen is soft.   Musculoskeletal:         General: Normal range of motion.   Skin:     General: Skin is warm.      Capillary Refill: Capillary refill takes 2 to 3 seconds.      Comments: Digital ischemia Right digit   Neurological:      General: No focal deficit present.      Mental Status: He is alert.   Psychiatric:         Mood and Affect: Mood normal.         Assessment & Plan     Independent Review of Studies  3/2022 ANDREW: Right 1.04 " triphasic fem, biphasic pop.  Left 1.07 triphasic  3/2022 R Art US: R SFA patent, EDY open.  PT/Peroneal are occluded which is known  1. PVD (peripheral vascular disease) (HCC)  Normal indices.  Waveforms biphasic at the popliteal level on the right side.    Arterial ultrasound obtained, sustained patency of R SFA post intervention    Digital ischemia of 2nd Rt toe is progressive will need podiatry attention in the near future.     Xarelto, Plavix, Statin.  Unfortunately continues to smoke.     - Duplex Lower Extremity Art / Grafts - Right CAR; Future  - Ambulatory Referral to Podiatry    2. Nicotine dependence, cigarettes, with other nicotine-induced disorders  Smoking cessation assistance options offered including behavioral counseling (Smoking Cessation Classes), Nicotine replacement therapy (patches or gum), pharmacologic therapy (Chantix, Wellbutrin). Understands tobacco increases risk of expanding AAA, MI, CVA, PAD, carcinoma. Discussion and question answer period 5-7 minutes.    3. Rapid heart rate  EKG showing sinus tach with SVT.  Sustained rates 130's to 155.  Looks more like atrial fibrillation.  Patient referred to emergency department for further evaluation.     Taken to registration via wheel chair    4. Ischemic toe  Referral to podiatry.  Potential need for demarcation of digit for intractable pain, worsening discoloration doesn't appear to viable.     Detailed discussion regarding risks, benefits, and treatment plan. Images independently reviewed. Patient understands, agrees, and wishes to proceed with plan.       This document has been electronically signed by TAMMI VillarrealCNP-BC @  On March 2, 2022 15:27 CST

## 2022-03-03 PROBLEM — I48.91 ATRIAL FIBRILLATION WITH RAPID VENTRICULAR RESPONSE: Status: ACTIVE | Noted: 2022-03-03

## 2022-03-03 LAB
ALBUMIN SERPL-MCNC: 3.6 G/DL (ref 3.5–5.2)
ALBUMIN/GLOB SERPL: 1.2 G/DL
ALP SERPL-CCNC: 111 U/L (ref 39–117)
ALT SERPL W P-5'-P-CCNC: 12 U/L (ref 1–41)
ANION GAP SERPL CALCULATED.3IONS-SCNC: 11 MMOL/L (ref 5–15)
AST SERPL-CCNC: 18 U/L (ref 1–40)
BASOPHILS # BLD AUTO: 0.07 10*3/MM3 (ref 0–0.2)
BASOPHILS NFR BLD AUTO: 1.1 % (ref 0–1.5)
BILIRUB SERPL-MCNC: 1 MG/DL (ref 0–1.2)
BUN SERPL-MCNC: 9 MG/DL (ref 6–20)
BUN/CREAT SERPL: 9.3 (ref 7–25)
CALCIUM SPEC-SCNC: 9.3 MG/DL (ref 8.6–10.5)
CHLORIDE SERPL-SCNC: 102 MMOL/L (ref 98–107)
CO2 SERPL-SCNC: 25 MMOL/L (ref 22–29)
CREAT SERPL-MCNC: 0.97 MG/DL (ref 0.76–1.27)
DEPRECATED RDW RBC AUTO: 47.7 FL (ref 37–54)
EGFRCR SERPLBLD CKD-EPI 2021: 94.5 ML/MIN/1.73
EOSINOPHIL # BLD AUTO: 0.18 10*3/MM3 (ref 0–0.4)
EOSINOPHIL NFR BLD AUTO: 2.9 % (ref 0.3–6.2)
ERYTHROCYTE [DISTWIDTH] IN BLOOD BY AUTOMATED COUNT: 13.5 % (ref 12.3–15.4)
GLOBULIN UR ELPH-MCNC: 3 GM/DL
GLUCOSE SERPL-MCNC: 108 MG/DL (ref 65–99)
HCT VFR BLD AUTO: 56.3 % (ref 37.5–51)
HGB BLD-MCNC: 19.3 G/DL (ref 13–17.7)
IMM GRANULOCYTES # BLD AUTO: 0.04 10*3/MM3 (ref 0–0.05)
IMM GRANULOCYTES NFR BLD AUTO: 0.6 % (ref 0–0.5)
LYMPHOCYTES # BLD AUTO: 0.95 10*3/MM3 (ref 0.7–3.1)
LYMPHOCYTES NFR BLD AUTO: 15.3 % (ref 19.6–45.3)
MCH RBC QN AUTO: 32.7 PG (ref 26.6–33)
MCHC RBC AUTO-ENTMCNC: 34.3 G/DL (ref 31.5–35.7)
MCV RBC AUTO: 95.4 FL (ref 79–97)
MONOCYTES # BLD AUTO: 0.63 10*3/MM3 (ref 0.1–0.9)
MONOCYTES NFR BLD AUTO: 10.2 % (ref 5–12)
NEUTROPHILS NFR BLD AUTO: 4.33 10*3/MM3 (ref 1.7–7)
NEUTROPHILS NFR BLD AUTO: 69.9 % (ref 42.7–76)
NRBC BLD AUTO-RTO: 0 /100 WBC (ref 0–0.2)
PLATELET # BLD AUTO: 149 10*3/MM3 (ref 140–450)
PMV BLD AUTO: 11.2 FL (ref 6–12)
POTASSIUM SERPL-SCNC: 4.4 MMOL/L (ref 3.5–5.2)
PROT SERPL-MCNC: 6.6 G/DL (ref 6–8.5)
QT INTERVAL: 258 MS
QTC INTERVAL: 388 MS
RBC # BLD AUTO: 5.9 10*6/MM3 (ref 4.14–5.8)
SODIUM SERPL-SCNC: 138 MMOL/L (ref 136–145)
T4 FREE SERPL-MCNC: 1.38 NG/DL (ref 0.93–1.7)
TSH SERPL DL<=0.05 MIU/L-ACNC: 1.22 UIU/ML (ref 0.27–4.2)
WBC NRBC COR # BLD: 6.2 10*3/MM3 (ref 3.4–10.8)

## 2022-03-03 PROCEDURE — 99204 OFFICE O/P NEW MOD 45 MIN: CPT | Performed by: NURSE PRACTITIONER

## 2022-03-03 PROCEDURE — 84443 ASSAY THYROID STIM HORMONE: CPT | Performed by: FAMILY MEDICINE

## 2022-03-03 PROCEDURE — 84439 ASSAY OF FREE THYROXINE: CPT | Performed by: FAMILY MEDICINE

## 2022-03-03 PROCEDURE — 93005 ELECTROCARDIOGRAM TRACING: CPT

## 2022-03-03 PROCEDURE — G0378 HOSPITAL OBSERVATION PER HR: HCPCS

## 2022-03-03 PROCEDURE — 80053 COMPREHEN METABOLIC PANEL: CPT | Performed by: FAMILY MEDICINE

## 2022-03-03 PROCEDURE — 25010000002 HYDROMORPHONE 1 MG/ML SOLUTION

## 2022-03-03 PROCEDURE — 36415 COLL VENOUS BLD VENIPUNCTURE: CPT | Performed by: FAMILY MEDICINE

## 2022-03-03 PROCEDURE — 25010000002 HYDROMORPHONE 1 MG/ML SOLUTION: Performed by: PODIATRIST

## 2022-03-03 PROCEDURE — 85025 COMPLETE CBC W/AUTO DIFF WBC: CPT | Performed by: FAMILY MEDICINE

## 2022-03-03 PROCEDURE — 25010000002 LORAZEPAM PER 2 MG: Performed by: FAMILY MEDICINE

## 2022-03-03 RX ORDER — DILTIAZEM HYDROCHLORIDE 120 MG/1
120 CAPSULE, COATED, EXTENDED RELEASE ORAL
Status: DISCONTINUED | OUTPATIENT
Start: 2022-03-03 | End: 2022-03-07 | Stop reason: HOSPADM

## 2022-03-03 RX ORDER — DILTIAZEM HYDROCHLORIDE 60 MG/1
60 TABLET, FILM COATED ORAL EVERY 6 HOURS SCHEDULED
Status: DISCONTINUED | OUTPATIENT
Start: 2022-03-03 | End: 2022-03-03

## 2022-03-03 RX ORDER — ATORVASTATIN CALCIUM 40 MG/1
40 TABLET, FILM COATED ORAL NIGHTLY
Status: DISCONTINUED | OUTPATIENT
Start: 2022-03-03 | End: 2022-03-07 | Stop reason: HOSPADM

## 2022-03-03 RX ADMIN — LORAZEPAM 1 MG: 2 INJECTION INTRAMUSCULAR; INTRAVENOUS at 20:20

## 2022-03-03 RX ADMIN — HYDROMORPHONE HYDROCHLORIDE 0.5 MG: 1 INJECTION, SOLUTION INTRAMUSCULAR; INTRAVENOUS; SUBCUTANEOUS at 08:21

## 2022-03-03 RX ADMIN — HYDROMORPHONE HYDROCHLORIDE 0.5 MG: 1 INJECTION, SOLUTION INTRAMUSCULAR; INTRAVENOUS; SUBCUTANEOUS at 17:37

## 2022-03-03 RX ADMIN — Medication 10 ML: at 20:21

## 2022-03-03 RX ADMIN — HYDROMORPHONE HYDROCHLORIDE 0.5 MG: 1 INJECTION, SOLUTION INTRAMUSCULAR; INTRAVENOUS; SUBCUTANEOUS at 15:42

## 2022-03-03 RX ADMIN — Medication 10 ML: at 08:21

## 2022-03-03 RX ADMIN — LORAZEPAM 1 MG: 2 INJECTION INTRAMUSCULAR; INTRAVENOUS at 14:52

## 2022-03-03 RX ADMIN — LORAZEPAM 1 MG: 2 INJECTION INTRAMUSCULAR; INTRAVENOUS at 10:01

## 2022-03-03 RX ADMIN — HYDROMORPHONE HYDROCHLORIDE 0.5 MG: 1 INJECTION, SOLUTION INTRAMUSCULAR; INTRAVENOUS; SUBCUTANEOUS at 23:28

## 2022-03-03 RX ADMIN — LORAZEPAM 1 MG: 2 INJECTION INTRAMUSCULAR; INTRAVENOUS at 03:39

## 2022-03-03 RX ADMIN — ATORVASTATIN CALCIUM 40 MG: 40 TABLET, FILM COATED ORAL at 20:20

## 2022-03-03 RX ADMIN — DILTIAZEM HYDROCHLORIDE 120 MG: 120 CAPSULE, COATED, EXTENDED RELEASE ORAL at 12:19

## 2022-03-03 NOTE — OUTREACH NOTE
Medical Week 2 Survey      Responses   [facility] patient discharged from? [place]   Does the patient have one of the following disease processes/diagnoses(primary or secondary)? Other   Week 2 attempt successful? No   Revoke Readmitted          Latha Canales RN

## 2022-03-04 ENCOUNTER — ANESTHESIA EVENT (OUTPATIENT)
Dept: PERIOP | Facility: HOSPITAL | Age: 52
DRG: 505 | End: 2022-03-04
Payer: MEDICAID

## 2022-03-04 LAB
ANION GAP SERPL CALCULATED.3IONS-SCNC: 11 MMOL/L (ref 5–15)
BASOPHILS # BLD AUTO: 0.06 10*3/MM3 (ref 0–0.2)
BASOPHILS NFR BLD AUTO: 1 % (ref 0–1.5)
BUN SERPL-MCNC: 12 MG/DL (ref 6–20)
BUN/CREAT SERPL: 14.1 (ref 7–25)
CALCIUM SPEC-SCNC: 9.2 MG/DL (ref 8.6–10.5)
CHLORIDE SERPL-SCNC: 99 MMOL/L (ref 98–107)
CO2 SERPL-SCNC: 24 MMOL/L (ref 22–29)
CREAT SERPL-MCNC: 0.85 MG/DL (ref 0.76–1.27)
DEPRECATED RDW RBC AUTO: 48.2 FL (ref 37–54)
EGFRCR SERPLBLD CKD-EPI 2021: 105.2 ML/MIN/1.73
EOSINOPHIL # BLD AUTO: 0.2 10*3/MM3 (ref 0–0.4)
EOSINOPHIL NFR BLD AUTO: 3.4 % (ref 0.3–6.2)
ERYTHROCYTE [DISTWIDTH] IN BLOOD BY AUTOMATED COUNT: 13.3 % (ref 12.3–15.4)
GLUCOSE SERPL-MCNC: 105 MG/DL (ref 65–99)
HCT VFR BLD AUTO: 54.6 % (ref 37.5–51)
HGB BLD-MCNC: 18.7 G/DL (ref 13–17.7)
IMM GRANULOCYTES # BLD AUTO: 0.02 10*3/MM3 (ref 0–0.05)
IMM GRANULOCYTES NFR BLD AUTO: 0.3 % (ref 0–0.5)
LYMPHOCYTES # BLD AUTO: 1.07 10*3/MM3 (ref 0.7–3.1)
LYMPHOCYTES NFR BLD AUTO: 18.3 % (ref 19.6–45.3)
MCH RBC QN AUTO: 33.3 PG (ref 26.6–33)
MCHC RBC AUTO-ENTMCNC: 34.2 G/DL (ref 31.5–35.7)
MCV RBC AUTO: 97.3 FL (ref 79–97)
MONOCYTES # BLD AUTO: 0.53 10*3/MM3 (ref 0.1–0.9)
MONOCYTES NFR BLD AUTO: 9 % (ref 5–12)
NEUTROPHILS NFR BLD AUTO: 3.98 10*3/MM3 (ref 1.7–7)
NEUTROPHILS NFR BLD AUTO: 68 % (ref 42.7–76)
NRBC BLD AUTO-RTO: 0 /100 WBC (ref 0–0.2)
PLATELET # BLD AUTO: 143 10*3/MM3 (ref 140–450)
PMV BLD AUTO: 11.9 FL (ref 6–12)
POTASSIUM SERPL-SCNC: 3.9 MMOL/L (ref 3.5–5.2)
QT INTERVAL: 256 MS
QTC INTERVAL: 411 MS
RBC # BLD AUTO: 5.61 10*6/MM3 (ref 4.14–5.8)
SODIUM SERPL-SCNC: 134 MMOL/L (ref 136–145)
WBC NRBC COR # BLD: 5.86 10*3/MM3 (ref 3.4–10.8)

## 2022-03-04 PROCEDURE — 94799 UNLISTED PULMONARY SVC/PX: CPT

## 2022-03-04 PROCEDURE — 85025 COMPLETE CBC W/AUTO DIFF WBC: CPT | Performed by: FAMILY MEDICINE

## 2022-03-04 PROCEDURE — 25010000002 HYDROMORPHONE 1 MG/ML SOLUTION

## 2022-03-04 PROCEDURE — 80048 BASIC METABOLIC PNL TOTAL CA: CPT | Performed by: FAMILY MEDICINE

## 2022-03-04 PROCEDURE — 99254 IP/OBS CNSLTJ NEW/EST MOD 60: CPT | Performed by: PODIATRIST

## 2022-03-04 RX ADMIN — Medication 10 ML: at 20:26

## 2022-03-04 RX ADMIN — ATORVASTATIN CALCIUM 40 MG: 40 TABLET, FILM COATED ORAL at 20:26

## 2022-03-04 RX ADMIN — Medication 10 ML: at 08:58

## 2022-03-04 RX ADMIN — HYDROMORPHONE HYDROCHLORIDE 0.5 MG: 1 INJECTION, SOLUTION INTRAMUSCULAR; INTRAVENOUS; SUBCUTANEOUS at 06:10

## 2022-03-04 RX ADMIN — HYDROMORPHONE HYDROCHLORIDE 0.5 MG: 1 INJECTION, SOLUTION INTRAMUSCULAR; INTRAVENOUS; SUBCUTANEOUS at 20:26

## 2022-03-04 RX ADMIN — HYDROMORPHONE HYDROCHLORIDE 0.5 MG: 1 INJECTION, SOLUTION INTRAMUSCULAR; INTRAVENOUS; SUBCUTANEOUS at 13:12

## 2022-03-04 RX ADMIN — DILTIAZEM HYDROCHLORIDE 120 MG: 120 CAPSULE, COATED, EXTENDED RELEASE ORAL at 08:58

## 2022-03-05 ENCOUNTER — ANESTHESIA (OUTPATIENT)
Dept: PERIOP | Facility: HOSPITAL | Age: 52
DRG: 505 | End: 2022-03-05
Payer: MEDICAID

## 2022-03-05 LAB
ANION GAP SERPL CALCULATED.3IONS-SCNC: 12 MMOL/L (ref 5–15)
BASOPHILS # BLD AUTO: 0.05 10*3/MM3 (ref 0–0.2)
BASOPHILS NFR BLD AUTO: 0.9 % (ref 0–1.5)
BUN SERPL-MCNC: 13 MG/DL (ref 6–20)
BUN/CREAT SERPL: 15.3 (ref 7–25)
CALCIUM SPEC-SCNC: 9 MG/DL (ref 8.6–10.5)
CHLORIDE SERPL-SCNC: 100 MMOL/L (ref 98–107)
CO2 SERPL-SCNC: 25 MMOL/L (ref 22–29)
CREAT SERPL-MCNC: 0.85 MG/DL (ref 0.76–1.27)
DEPRECATED RDW RBC AUTO: 45.5 FL (ref 37–54)
EGFRCR SERPLBLD CKD-EPI 2021: 105.2 ML/MIN/1.73
EOSINOPHIL # BLD AUTO: 0.19 10*3/MM3 (ref 0–0.4)
EOSINOPHIL NFR BLD AUTO: 3.4 % (ref 0.3–6.2)
ERYTHROCYTE [DISTWIDTH] IN BLOOD BY AUTOMATED COUNT: 13.2 % (ref 12.3–15.4)
GLUCOSE SERPL-MCNC: 110 MG/DL (ref 65–99)
HCT VFR BLD AUTO: 54.1 % (ref 37.5–51)
HGB BLD-MCNC: 18.7 G/DL (ref 13–17.7)
IMM GRANULOCYTES # BLD AUTO: 0.02 10*3/MM3 (ref 0–0.05)
IMM GRANULOCYTES NFR BLD AUTO: 0.4 % (ref 0–0.5)
INR PPP: 1.06 (ref 0.8–1.2)
LYMPHOCYTES # BLD AUTO: 1.2 10*3/MM3 (ref 0.7–3.1)
LYMPHOCYTES NFR BLD AUTO: 21.2 % (ref 19.6–45.3)
MCH RBC QN AUTO: 32.6 PG (ref 26.6–33)
MCHC RBC AUTO-ENTMCNC: 34.6 G/DL (ref 31.5–35.7)
MCV RBC AUTO: 94.3 FL (ref 79–97)
MONOCYTES # BLD AUTO: 0.58 10*3/MM3 (ref 0.1–0.9)
MONOCYTES NFR BLD AUTO: 10.2 % (ref 5–12)
NEUTROPHILS NFR BLD AUTO: 3.62 10*3/MM3 (ref 1.7–7)
NEUTROPHILS NFR BLD AUTO: 63.9 % (ref 42.7–76)
NRBC BLD AUTO-RTO: 0 /100 WBC (ref 0–0.2)
PLATELET # BLD AUTO: 132 10*3/MM3 (ref 140–450)
PMV BLD AUTO: 11.9 FL (ref 6–12)
POTASSIUM SERPL-SCNC: 4 MMOL/L (ref 3.5–5.2)
PROTHROMBIN TIME: 13.7 SECONDS (ref 11.1–15.3)
RBC # BLD AUTO: 5.74 10*6/MM3 (ref 4.14–5.8)
SODIUM SERPL-SCNC: 137 MMOL/L (ref 136–145)
WBC NRBC COR # BLD: 5.66 10*3/MM3 (ref 3.4–10.8)

## 2022-03-05 PROCEDURE — 28825 PARTIAL AMPUTATION OF TOE: CPT | Performed by: PODIATRIST

## 2022-03-05 PROCEDURE — 85025 COMPLETE CBC W/AUTO DIFF WBC: CPT | Performed by: FAMILY MEDICINE

## 2022-03-05 PROCEDURE — 25010000002 HYDROMORPHONE 1 MG/ML SOLUTION: Performed by: PODIATRIST

## 2022-03-05 PROCEDURE — 0Y6R0Z1 DETACHMENT AT RIGHT 2ND TOE, HIGH, OPEN APPROACH: ICD-10-PCS | Performed by: PODIATRIST

## 2022-03-05 PROCEDURE — 25010000002 PROPOFOL 10 MG/ML EMULSION: Performed by: NURSE ANESTHETIST, CERTIFIED REGISTERED

## 2022-03-05 PROCEDURE — 85610 PROTHROMBIN TIME: CPT | Performed by: FAMILY MEDICINE

## 2022-03-05 PROCEDURE — 25010000002 CEFAZOLIN PER 500 MG: Performed by: NURSE ANESTHETIST, CERTIFIED REGISTERED

## 2022-03-05 PROCEDURE — 80048 BASIC METABOLIC PNL TOTAL CA: CPT | Performed by: FAMILY MEDICINE

## 2022-03-05 PROCEDURE — 25010000002 MIDAZOLAM PER 1 MG: Performed by: NURSE ANESTHETIST, CERTIFIED REGISTERED

## 2022-03-05 PROCEDURE — 88305 TISSUE EXAM BY PATHOLOGIST: CPT

## 2022-03-05 PROCEDURE — 25010000002 ONDANSETRON PER 1 MG: Performed by: NURSE ANESTHETIST, CERTIFIED REGISTERED

## 2022-03-05 PROCEDURE — 25010000002 FENTANYL CITRATE (PF) 50 MCG/ML SOLUTION: Performed by: NURSE ANESTHETIST, CERTIFIED REGISTERED

## 2022-03-05 PROCEDURE — 25010000002 HYDROMORPHONE 1 MG/ML SOLUTION

## 2022-03-05 PROCEDURE — 88311 DECALCIFY TISSUE: CPT

## 2022-03-05 RX ORDER — ACETAMINOPHEN 650 MG/1
650 SUPPOSITORY RECTAL ONCE AS NEEDED
Status: DISCONTINUED | OUTPATIENT
Start: 2022-03-05 | End: 2022-03-05 | Stop reason: HOSPADM

## 2022-03-05 RX ORDER — ONDANSETRON 2 MG/ML
INJECTION INTRAMUSCULAR; INTRAVENOUS AS NEEDED
Status: DISCONTINUED | OUTPATIENT
Start: 2022-03-05 | End: 2022-03-05 | Stop reason: SURG

## 2022-03-05 RX ORDER — LIDOCAINE HYDROCHLORIDE 20 MG/ML
INJECTION, SOLUTION INFILTRATION; PERINEURAL AS NEEDED
Status: DISCONTINUED | OUTPATIENT
Start: 2022-03-05 | End: 2022-03-05 | Stop reason: SURG

## 2022-03-05 RX ORDER — PROPOFOL 10 MG/ML
VIAL (ML) INTRAVENOUS AS NEEDED
Status: DISCONTINUED | OUTPATIENT
Start: 2022-03-05 | End: 2022-03-05 | Stop reason: SURG

## 2022-03-05 RX ORDER — NALOXONE HCL 0.4 MG/ML
0.4 VIAL (ML) INJECTION AS NEEDED
Status: DISCONTINUED | OUTPATIENT
Start: 2022-03-05 | End: 2022-03-05 | Stop reason: HOSPADM

## 2022-03-05 RX ORDER — BUPIVACAINE HYDROCHLORIDE 2.5 MG/ML
INJECTION, SOLUTION EPIDURAL; INFILTRATION; INTRACAUDAL AS NEEDED
Status: DISCONTINUED | OUTPATIENT
Start: 2022-03-05 | End: 2022-03-05 | Stop reason: HOSPADM

## 2022-03-05 RX ORDER — ONDANSETRON 2 MG/ML
4 INJECTION INTRAMUSCULAR; INTRAVENOUS ONCE AS NEEDED
Status: DISCONTINUED | OUTPATIENT
Start: 2022-03-05 | End: 2022-03-05 | Stop reason: HOSPADM

## 2022-03-05 RX ORDER — CEFAZOLIN SODIUM 1 G/3ML
INJECTION, POWDER, FOR SOLUTION INTRAMUSCULAR; INTRAVENOUS AS NEEDED
Status: DISCONTINUED | OUTPATIENT
Start: 2022-03-05 | End: 2022-03-05 | Stop reason: SURG

## 2022-03-05 RX ORDER — ACETAMINOPHEN 325 MG/1
650 TABLET ORAL ONCE AS NEEDED
Status: DISCONTINUED | OUTPATIENT
Start: 2022-03-05 | End: 2022-03-05 | Stop reason: HOSPADM

## 2022-03-05 RX ORDER — SODIUM CHLORIDE, SODIUM GLUCONATE, SODIUM ACETATE, POTASSIUM CHLORIDE AND MAGNESIUM CHLORIDE 526; 502; 368; 37; 30 MG/100ML; MG/100ML; MG/100ML; MG/100ML; MG/100ML
INJECTION, SOLUTION INTRAVENOUS CONTINUOUS PRN
Status: DISCONTINUED | OUTPATIENT
Start: 2022-03-05 | End: 2022-03-05 | Stop reason: SURG

## 2022-03-05 RX ORDER — FENTANYL CITRATE 50 UG/ML
INJECTION, SOLUTION INTRAMUSCULAR; INTRAVENOUS AS NEEDED
Status: DISCONTINUED | OUTPATIENT
Start: 2022-03-05 | End: 2022-03-05 | Stop reason: SURG

## 2022-03-05 RX ORDER — MIDAZOLAM HYDROCHLORIDE 1 MG/ML
INJECTION INTRAMUSCULAR; INTRAVENOUS AS NEEDED
Status: DISCONTINUED | OUTPATIENT
Start: 2022-03-05 | End: 2022-03-05 | Stop reason: SURG

## 2022-03-05 RX ORDER — LORAZEPAM 2 MG/ML
1 INJECTION INTRAMUSCULAR
Status: DISCONTINUED | OUTPATIENT
Start: 2022-03-05 | End: 2022-03-05 | Stop reason: HOSPADM

## 2022-03-05 RX ORDER — ALBUTEROL SULFATE 2.5 MG/3ML
2.5 SOLUTION RESPIRATORY (INHALATION) ONCE AS NEEDED
Status: DISCONTINUED | OUTPATIENT
Start: 2022-03-05 | End: 2022-03-05 | Stop reason: HOSPADM

## 2022-03-05 RX ADMIN — SODIUM CHLORIDE, SODIUM GLUCONATE, SODIUM ACETATE, POTASSIUM CHLORIDE AND MAGNESIUM CHLORIDE: 526; 502; 368; 37; 30 INJECTION, SOLUTION INTRAVENOUS at 09:36

## 2022-03-05 RX ADMIN — FENTANYL CITRATE 50 MCG: 50 INJECTION INTRAMUSCULAR; INTRAVENOUS at 09:42

## 2022-03-05 RX ADMIN — HYDROMORPHONE HYDROCHLORIDE 0.5 MG: 1 INJECTION, SOLUTION INTRAMUSCULAR; INTRAVENOUS; SUBCUTANEOUS at 21:33

## 2022-03-05 RX ADMIN — FENTANYL CITRATE 50 MCG: 50 INJECTION INTRAMUSCULAR; INTRAVENOUS at 09:46

## 2022-03-05 RX ADMIN — HYDROMORPHONE HYDROCHLORIDE 0.5 MG: 1 INJECTION, SOLUTION INTRAMUSCULAR; INTRAVENOUS; SUBCUTANEOUS at 19:10

## 2022-03-05 RX ADMIN — HYDROMORPHONE HYDROCHLORIDE 0.5 MG: 1 INJECTION, SOLUTION INTRAMUSCULAR; INTRAVENOUS; SUBCUTANEOUS at 23:44

## 2022-03-05 RX ADMIN — PROPOFOL 50 MG: 10 INJECTION, EMULSION INTRAVENOUS at 09:49

## 2022-03-05 RX ADMIN — FENTANYL CITRATE 50 MCG: 50 INJECTION INTRAMUSCULAR; INTRAVENOUS at 09:49

## 2022-03-05 RX ADMIN — FENTANYL CITRATE 50 MCG: 50 INJECTION INTRAMUSCULAR; INTRAVENOUS at 09:54

## 2022-03-05 RX ADMIN — MIDAZOLAM HYDROCHLORIDE 2 MG: 1 INJECTION, SOLUTION INTRAMUSCULAR; INTRAVENOUS at 09:34

## 2022-03-05 RX ADMIN — ATORVASTATIN CALCIUM 40 MG: 40 TABLET, FILM COATED ORAL at 20:14

## 2022-03-05 RX ADMIN — PROPOFOL 200 MG: 10 INJECTION, EMULSION INTRAVENOUS at 09:41

## 2022-03-05 RX ADMIN — ONDANSETRON 4 MG: 2 INJECTION INTRAMUSCULAR; INTRAVENOUS at 09:57

## 2022-03-05 RX ADMIN — Medication 10 ML: at 20:15

## 2022-03-05 RX ADMIN — HYDROMORPHONE HYDROCHLORIDE 0.5 MG: 1 INJECTION, SOLUTION INTRAMUSCULAR; INTRAVENOUS; SUBCUTANEOUS at 15:47

## 2022-03-05 RX ADMIN — PROPOFOL 50 MG: 10 INJECTION, EMULSION INTRAVENOUS at 09:54

## 2022-03-05 RX ADMIN — HYDROMORPHONE HYDROCHLORIDE 0.5 MG: 1 INJECTION, SOLUTION INTRAMUSCULAR; INTRAVENOUS; SUBCUTANEOUS at 03:20

## 2022-03-05 RX ADMIN — LIDOCAINE HYDROCHLORIDE 50 MG: 20 INJECTION, SOLUTION INFILTRATION; PERINEURAL at 09:41

## 2022-03-05 RX ADMIN — CEFAZOLIN SODIUM 2 G: 1 INJECTION, POWDER, FOR SOLUTION INTRAMUSCULAR; INTRAVENOUS at 09:46

## 2022-03-05 RX ADMIN — HYDROMORPHONE HYDROCHLORIDE 0.5 MG: 1 INJECTION, SOLUTION INTRAMUSCULAR; INTRAVENOUS; SUBCUTANEOUS at 13:28

## 2022-03-05 RX ADMIN — DILTIAZEM HYDROCHLORIDE 120 MG: 120 CAPSULE, COATED, EXTENDED RELEASE ORAL at 12:02

## 2022-03-05 NOTE — ANESTHESIA PROCEDURE NOTES
Airway  Urgency: elective    Date/Time: 3/5/2022 9:42 AM  Airway not difficult    General Information and Staff    Patient location during procedure: OR  CRNA: Daysi Galicia, SARAH    Indications and Patient Condition  Indications for airway management: airway protection    Preoxygenated: yes  Mask difficulty assessment: 0 - not attempted    Final Airway Details  Final airway type: supraglottic airway      Successful airway: I-gel  Size 4    Number of attempts at approach: 1  Assessment: lips, teeth, and gum same as pre-op and atraumatic intubation

## 2022-03-05 NOTE — ANESTHESIA POSTPROCEDURE EVALUATION
Patient: William Alan    Procedure Summary     Date: 03/05/22 Room / Location: [facility] OR  / [facility] OR    Anesthesia Start: 0936 Anesthesia Stop: 1018    Procedure: TOE AMPUTATION RIGHT FOOT SECOND DIGIT (Right ) Diagnosis:       Ischemic toe      (Ischemic toe [I99.8])    Surgeons: Wong Chow DPM Provider: Daysi Galicia CRNA    Anesthesia Type: general ASA Status: 3 - Emergent          Anesthesia Type: general    Vitals  No vitals data found for the desired time range.          Post Anesthesia Care and Evaluation    Patient location during evaluation: PACU  Patient participation: waiting for patient participation  Level of consciousness: sleepy but conscious  Pain score: 0  Pain management: adequate  Airway patency: patent  Anesthetic complications: No anesthetic complications  PONV Status: none  Cardiovascular status: acceptable and hemodynamically stable  Respiratory status: acceptable, oral airway and room air  Hydration status: acceptable

## 2022-03-05 NOTE — ANESTHESIA PREPROCEDURE EVALUATION
Anesthesia Evaluation     no history of anesthetic complications:               Airway   Mallampati: III  TM distance: <3 FB  Neck ROM: full  Possible difficult intubation and Small opening  Dental    (+) partials    Pulmonary - normal exam   (+) pulmonary embolism (resolved), a smoker Current Abstained day of surgery,   Cardiovascular     ECG reviewed  PT is on anticoagulation therapy  Rhythm: irregular  Rate: normal    (+) hypertension less than 2 medications, dysrhythmias Atrial Fib, PVD, DVT resolved, hyperlipidemia,     ROS comment: · Left ventricular wall thickness is consistent with mild concentric hypertrophy.  · Left ventricular ejection fraction appears to be 56 - 60%.  · The right ventricular cavity is borderline dilated.  · Saline test results are negative for right to left atrial level shunt.  · Estimated right ventricular systolic pressure from tricuspid regurgitation is normal (<35 mmHg).  · The right atrial cavity is borderline dilated.      Test Reason : IRR HEARTBEAT  Blood Pressure :   */*   mmHG  Vent. Rate : 155 BPM     Atrial Rate : 131 BPM     P-R Int : 166 ms          QRS Dur :  66 ms      QT Int : 256 ms       P-R-T Axes :   *  -1  62 degrees     QTc Int : 411 ms     a.fib with RVR  Abnormal ECG  No previous ECGs available      Neuro/Psych- negative ROS  GI/Hepatic/Renal/Endo - negative ROS     Musculoskeletal (-) negative ROS    Abdominal    Substance History   (+) alcohol use (daily, last use 03/02/22),      OB/GYN          Other - negative ROS       ROS/Med Hx Other: Last dose xarelto 03/02/22                Anesthesia Plan    ASA 3 - emergent     general     intravenous induction     Anesthetic plan, all risks, benefits, and alternatives have been provided, discussed and informed consent has been obtained with: patient.        CODE STATUS:    Level Of Support Discussed With: Patient  Code Status (Patient has no pulse and is not breathing): CPR (Attempt to Resuscitate)  Medical  Interventions (Patient has pulse or is breathing): Full Support

## 2022-03-06 PROCEDURE — 99231 SBSQ HOSP IP/OBS SF/LOW 25: CPT | Performed by: PODIATRIST

## 2022-03-06 PROCEDURE — 25010000002 HYDROMORPHONE 1 MG/ML SOLUTION: Performed by: PODIATRIST

## 2022-03-06 RX ORDER — POLYETHYLENE GLYCOL 3350 17 G/17G
17 POWDER, FOR SOLUTION ORAL DAILY PRN
Status: DISCONTINUED | OUTPATIENT
Start: 2022-03-06 | End: 2022-03-07 | Stop reason: HOSPADM

## 2022-03-06 RX ORDER — BISACODYL 10 MG
10 SUPPOSITORY, RECTAL RECTAL DAILY PRN
Status: DISCONTINUED | OUTPATIENT
Start: 2022-03-06 | End: 2022-03-07 | Stop reason: HOSPADM

## 2022-03-06 RX ORDER — HYDROCODONE BITARTRATE AND ACETAMINOPHEN 10; 325 MG/1; MG/1
1 TABLET ORAL EVERY 6 HOURS PRN
Status: DISCONTINUED | OUTPATIENT
Start: 2022-03-06 | End: 2022-03-07 | Stop reason: HOSPADM

## 2022-03-06 RX ORDER — BISACODYL 5 MG/1
5 TABLET, DELAYED RELEASE ORAL DAILY PRN
Status: DISCONTINUED | OUTPATIENT
Start: 2022-03-06 | End: 2022-03-07 | Stop reason: HOSPADM

## 2022-03-06 RX ORDER — AMOXICILLIN 250 MG
2 CAPSULE ORAL 2 TIMES DAILY
Status: DISCONTINUED | OUTPATIENT
Start: 2022-03-06 | End: 2022-03-07 | Stop reason: HOSPADM

## 2022-03-06 RX ADMIN — Medication 10 ML: at 20:04

## 2022-03-06 RX ADMIN — HYDROMORPHONE HYDROCHLORIDE 0.5 MG: 1 INJECTION, SOLUTION INTRAMUSCULAR; INTRAVENOUS; SUBCUTANEOUS at 04:29

## 2022-03-06 RX ADMIN — DOCUSATE SODIUM 50 MG AND SENNOSIDES 8.6 MG 2 TABLET: 8.6; 5 TABLET, FILM COATED ORAL at 20:03

## 2022-03-06 RX ADMIN — HYDROMORPHONE HYDROCHLORIDE 0.5 MG: 1 INJECTION, SOLUTION INTRAMUSCULAR; INTRAVENOUS; SUBCUTANEOUS at 02:08

## 2022-03-06 RX ADMIN — HYDROMORPHONE HYDROCHLORIDE 0.5 MG: 1 INJECTION, SOLUTION INTRAMUSCULAR; INTRAVENOUS; SUBCUTANEOUS at 08:25

## 2022-03-06 RX ADMIN — ATORVASTATIN CALCIUM 40 MG: 40 TABLET, FILM COATED ORAL at 20:03

## 2022-03-06 RX ADMIN — DOCUSATE SODIUM 50 MG AND SENNOSIDES 8.6 MG 2 TABLET: 8.6; 5 TABLET, FILM COATED ORAL at 11:52

## 2022-03-06 RX ADMIN — HYDROCODONE BITARTRATE AND ACETAMINOPHEN 1 TABLET: 10; 325 TABLET ORAL at 11:51

## 2022-03-06 RX ADMIN — HYDROCODONE BITARTRATE AND ACETAMINOPHEN 1 TABLET: 10; 325 TABLET ORAL at 22:27

## 2022-03-06 RX ADMIN — DILTIAZEM HYDROCHLORIDE 120 MG: 120 CAPSULE, COATED, EXTENDED RELEASE ORAL at 08:25

## 2022-03-07 VITALS
SYSTOLIC BLOOD PRESSURE: 117 MMHG | DIASTOLIC BLOOD PRESSURE: 68 MMHG | HEART RATE: 65 BPM | OXYGEN SATURATION: 92 % | HEIGHT: 72 IN | TEMPERATURE: 97.8 F | RESPIRATION RATE: 18 BRPM | BODY MASS INDEX: 29.17 KG/M2 | WEIGHT: 215.4 LBS

## 2022-03-07 RX ORDER — HYDROCODONE BITARTRATE AND ACETAMINOPHEN 10; 325 MG/1; MG/1
1 TABLET ORAL EVERY 6 HOURS PRN
Qty: 12 TABLET | Refills: 0 | Status: SHIPPED | OUTPATIENT
Start: 2022-03-07 | End: 2022-04-26

## 2022-03-07 RX ORDER — DILTIAZEM HYDROCHLORIDE 120 MG/1
120 CAPSULE, COATED, EXTENDED RELEASE ORAL
Qty: 90 CAPSULE | Refills: 0 | Status: SHIPPED | OUTPATIENT
Start: 2022-03-08 | End: 2022-03-21 | Stop reason: SDUPTHER

## 2022-03-07 RX ADMIN — DILTIAZEM HYDROCHLORIDE 120 MG: 120 CAPSULE, COATED, EXTENDED RELEASE ORAL at 08:34

## 2022-03-07 RX ADMIN — Medication 10 ML: at 08:34

## 2022-03-07 RX ADMIN — DOCUSATE SODIUM 50 MG AND SENNOSIDES 8.6 MG 2 TABLET: 8.6; 5 TABLET, FILM COATED ORAL at 08:34

## 2022-03-08 ENCOUNTER — READMISSION MANAGEMENT (OUTPATIENT)
Dept: CALL CENTER | Facility: HOSPITAL | Age: 52
End: 2022-03-08

## 2022-03-08 NOTE — OUTREACH NOTE
Prep Survey    Flowsheet Row Responses   [facility] facility patient discharged from? [place]   Is LACE score < 7 ? No   Emergency Room discharge w/ pulse ox? No   Eligibility Readm Mgmt   Discharge diagnosis Afib c RVR                 3/5 TOE AMPUTATION RIGHT FOOT SECOND DIGIT   Does the patient have one of the following disease processes/diagnoses(primary or secondary)? General Surgery   Does the patient have Home health ordered? No   Is there a DME ordered? No   Prep survey completed? Yes          DUKE TORRES - Registered Nurse        
right upper arm

## 2022-03-11 ENCOUNTER — READMISSION MANAGEMENT (OUTPATIENT)
Dept: CALL CENTER | Facility: HOSPITAL | Age: 52
End: 2022-03-11

## 2022-03-11 LAB
LAB AP CASE REPORT: NORMAL
PATH REPORT.FINAL DX SPEC: NORMAL

## 2022-03-11 NOTE — OUTREACH NOTE
General Surgery Week 1 Survey    Flowsheet Row Responses   [facility] patient discharged from? [place]   Does the patient have one of the following disease processes/diagnoses(primary or secondary)? General Surgery   Week 1 attempt successful? Yes   Call start time 1111   Call end time 1114   Discharge diagnosis Afib c RVR                 3/5 TOE AMPUTATION RIGHT FOOT SECOND DIGIT   Person spoke with today (if not patient) and relationship [place]-step parent   Meds reviewed with patient/caregiver? Yes   Is the patient having any side effects they believe may be caused by any medication additions or changes? No   Does the patient have all medications related to this admission filled (includes all antibiotics, pain medications, etc.) Yes   Is the patient taking all medications as directed (includes completed medication regime)? Yes   Does the patient have a follow up appointment scheduled with their surgeon? Yes  [3/14/22]   Has the patient kept scheduled appointments due by today? N/A   Has home health visited the patient within 72 hours of discharge? N/A   Psychosocial issues? No   Did the patient receive a copy of their discharge instructions? Yes   Nursing interventions Reviewed instructions with patient   Nursing interventions Nurse provided patient education   Is the patient /caregiver able to teach back basic post-op care? Drive as instructed by MD in discharge instructions, Take showers only when approved by MD-sponge bathe until then   Is the patient/caregiver able to teach back signs and symptoms of incisional infection? Increased redness, swelling or pain at the incisonal site, Increased drainage or bleeding, Incisional warmth, Pus or odor from incision, Fever   Is the patient/caregiver able to teach back steps to recovery at home? Eat a well-balance diet   If the patient is a current smoker, are they able to teach back resources for cessation? [id number]   Is the patient/caregiver able to  teach back the hierarchy of who to call/visit for symptoms/problems? PCP, Specialist, Home health nurse, Urgent Care, ED, 911 Yes   Week 1 call completed? Yes          CLAUDIA NEAL - Registered Nurse

## 2022-03-14 ENCOUNTER — OFFICE VISIT (OUTPATIENT)
Dept: PODIATRY | Facility: CLINIC | Age: 52
End: 2022-03-14

## 2022-03-14 VITALS — WEIGHT: 215 LBS | HEIGHT: 72 IN | OXYGEN SATURATION: 100 % | HEART RATE: 97 BPM | BODY MASS INDEX: 29.12 KG/M2

## 2022-03-14 DIAGNOSIS — I96 GANGRENE OF TOE: Primary | ICD-10-CM

## 2022-03-14 PROCEDURE — 99212 OFFICE O/P EST SF 10 MIN: CPT | Performed by: PODIATRIST

## 2022-03-14 RX ORDER — HYDROCODONE BITARTRATE AND ACETAMINOPHEN 7.5; 325 MG/1; MG/1
1 TABLET ORAL EVERY 8 HOURS PRN
Qty: 21 TABLET | Refills: 0 | Status: SHIPPED | OUTPATIENT
Start: 2022-03-14 | End: 2022-04-26

## 2022-03-14 NOTE — PROGRESS NOTES
William Alan  1970  51 y.o. male    Right foot post op second toe amputation    03/14/2022    Chief Complaint   Patient presents with   • Right Foot - Follow-up     Post op       History of Present Illness    William Alan is a 51 y.o.male who presents to clinic for his first postoperative visit.  Patient had right second digit amputation on March 5.      Past Medical History:   Diagnosis Date   • HTN (hypertension)    • Hyperlipidemia    • Obesity          Past Surgical History:   Procedure Laterality Date   • AMPUTATION DIGIT Left 2007    partial index finger 1st toe (MRSA)   • AMPUTATION DIGIT Right 3/5/2022    Procedure: TOE AMPUTATION RIGHT FOOT SECOND DIGIT;  Surgeon: Wong Chow DPM;  Location: [facility] OR;  Service: Podiatry;  Laterality: Right;   • INTERVENTIONAL RADIOLOGY PROCEDURE N/A 2/21/2022    Procedure: Abdominal Aortagram with Runoff possible angioplasty stent atherectomy thrombolysis;  Surgeon: Massimo Haley MD;  Location: [facility]VAS[facility] LOCATION;  Service: Interventional Radiology;  Laterality: N/A;         History reviewed. No pertinent family history.    No Known Allergies    Social History     Socioeconomic History   • Marital status:    Tobacco Use   • Smoking status: Current Every Day Smoker     Packs/day: 1.00     Types: Cigarettes   • Smokeless tobacco: Never Used   Substance and Sexual Activity   • Alcohol use: Yes     Comment: daily, 6-12 beers daily   • Drug use: Never   • Sexual activity: Defer         Current Outpatient Medications   Medication Sig Dispense Refill   • atorvastatin (LIPITOR) 40 MG tablet Take 1 tablet by mouth Every Night. 30 tablet 0   • clopidogrel (PLAVIX) 75 MG tablet Take 1 tablet by mouth Daily. 30 tablet 0   • dilTIAZem CD (CARDIZEM CD) 120 MG 24 hr capsule Take 1 capsule by mouth Daily for 90 days. 90 capsule 0   • HYDROcodone-acetaminophen (NORCO)  MG per tablet Take 1 tablet by mouth Every 6 (Six) Hours As Needed for  "Moderate Pain  or Severe Pain  for up to 12 doses. 12 tablet 0   • rivaroxaban (XARELTO) 20 MG tablet Take 1 tablet by mouth Daily. Indications: Other - full anticoagulation 30 tablet 0   • HYDROcodone-acetaminophen (Norco) 7.5-325 MG per tablet Take 1 tablet by mouth Every 8 (Eight) Hours As Needed for Moderate Pain . 21 tablet 0     No current facility-administered medications for this visit.       Review of Systems   Musculoskeletal:        Foot pain         OBJECTIVE    Pulse 97   Ht 182.9 cm (72\")   Wt 97.5 kg (215 lb)   SpO2 100%   BMI 29.16 kg/m²     Physical Exam  Vitals reviewed.   Constitutional:       General: He is not in acute distress.     Appearance: He is well-developed.   HENT:      Head: Normocephalic and atraumatic.      Nose: Nose normal.   Eyes:      Conjunctiva/sclera: Conjunctivae normal.      Pupils: Pupils are equal, round, and reactive to light.   Pulmonary:      Effort: Pulmonary effort is normal. No respiratory distress.      Breath sounds: No wheezing.   Musculoskeletal:         General: No deformity. Normal range of motion.   Skin:     General: Skin is warm and dry.      Capillary Refill: Capillary refill takes less than 2 seconds.   Neurological:      Mental Status: He is alert and oriented to person, place, and time.   Psychiatric:         Behavior: Behavior normal.         Thought Content: Thought content normal.          Right lower extremity: Incision site to partial second digit amputation well approximated.  No signs of dehiscence.      Foot/Ankle Exam        Procedures        ASSESSMENT AND PLAN    Diagnoses and all orders for this visit:    1. Gangrene of toe (HCC) (Primary)  -     HYDROcodone-acetaminophen (Norco) 7.5-325 MG per tablet; Take 1 tablet by mouth Every 8 (Eight) Hours As Needed for Moderate Pain .  Dispense: 21 tablet; Refill: 0        Patient doing well postoperative.  Dressing change performed.  Keep clean, dry and intact.  Weightbearing surgical shoe.  " Recheck 1 week.          This document has been electronically signed by Wong Chow DPM on March 14, 2022 16:26 CDT     3/14/2022  16:26 CDT

## 2022-03-18 ENCOUNTER — READMISSION MANAGEMENT (OUTPATIENT)
Dept: CALL CENTER | Facility: HOSPITAL | Age: 52
End: 2022-03-18

## 2022-03-21 ENCOUNTER — OFFICE VISIT (OUTPATIENT)
Dept: PODIATRY | Facility: CLINIC | Age: 52
End: 2022-03-21

## 2022-03-21 ENCOUNTER — OFFICE VISIT (OUTPATIENT)
Dept: CARDIOLOGY | Facility: CLINIC | Age: 52
End: 2022-03-21

## 2022-03-21 VITALS
TEMPERATURE: 96.8 F | BODY MASS INDEX: 30.8 KG/M2 | WEIGHT: 227.4 LBS | SYSTOLIC BLOOD PRESSURE: 130 MMHG | HEART RATE: 111 BPM | DIASTOLIC BLOOD PRESSURE: 78 MMHG | HEIGHT: 72 IN | OXYGEN SATURATION: 98 %

## 2022-03-21 VITALS — OXYGEN SATURATION: 97 % | WEIGHT: 215 LBS | HEART RATE: 122 BPM | BODY MASS INDEX: 29.12 KG/M2 | HEIGHT: 72 IN

## 2022-03-21 DIAGNOSIS — F17.218 NICOTINE DEPENDENCE, CIGARETTES, WITH OTHER NICOTINE-INDUCED DISORDERS: ICD-10-CM

## 2022-03-21 DIAGNOSIS — I10 BENIGN ESSENTIAL HTN: ICD-10-CM

## 2022-03-21 DIAGNOSIS — I82.432 ACUTE DEEP VEIN THROMBOSIS (DVT) OF POPLITEAL VEIN OF LEFT LOWER EXTREMITY: ICD-10-CM

## 2022-03-21 DIAGNOSIS — I96 GANGRENE OF TOE: Primary | ICD-10-CM

## 2022-03-21 DIAGNOSIS — I48.0 PAROXYSMAL ATRIAL FIBRILLATION: ICD-10-CM

## 2022-03-21 DIAGNOSIS — Z09 FOLLOW UP: Primary | ICD-10-CM

## 2022-03-21 PROCEDURE — 99214 OFFICE O/P EST MOD 30 MIN: CPT | Performed by: INTERNAL MEDICINE

## 2022-03-21 PROCEDURE — 99212 OFFICE O/P EST SF 10 MIN: CPT | Performed by: PODIATRIST

## 2022-03-21 PROCEDURE — 93000 ELECTROCARDIOGRAM COMPLETE: CPT | Performed by: INTERNAL MEDICINE

## 2022-03-21 RX ORDER — DILTIAZEM HYDROCHLORIDE 120 MG/1
120 CAPSULE, COATED, EXTENDED RELEASE ORAL
Qty: 90 CAPSULE | Refills: 3 | Status: SHIPPED | OUTPATIENT
Start: 2022-03-21 | End: 2022-06-19

## 2022-03-21 RX ORDER — HYDROCODONE BITARTRATE AND ACETAMINOPHEN 5; 325 MG/1; MG/1
1 TABLET ORAL EVERY 12 HOURS PRN
Qty: 14 TABLET | Refills: 0 | Status: SHIPPED | OUTPATIENT
Start: 2022-03-21 | End: 2022-04-26

## 2022-03-21 RX ORDER — DILTIAZEM HYDROCHLORIDE 120 MG/1
CAPSULE, EXTENDED RELEASE ORAL
COMMUNITY

## 2022-03-21 RX ORDER — OXYCODONE AND ACETAMINOPHEN 7.5; 325 MG/1; MG/1
TABLET ORAL EVERY 8 HOURS
COMMUNITY
End: 2022-04-26

## 2022-03-21 RX ORDER — CLOPIDOGREL BISULFATE 75 MG/1
TABLET ORAL
COMMUNITY
End: 2022-03-21

## 2022-03-21 RX ORDER — ATORVASTATIN CALCIUM 40 MG/1
TABLET, FILM COATED ORAL
COMMUNITY
End: 2022-03-21

## 2022-03-21 NOTE — PROGRESS NOTES
William Alan  51 y.o. male    03/21/2022  Chief Complaint   Patient presents with   • Hospital Follow Up Visit       History of Present Illness  Patient with history of atrial fibrillation    -        SUBJECTIVE  Patient with a history of peripheral vascular disease including thrombus.  He has been seen Jt Juarez that day in clinic and he was in a very fast heartbeat.  ECG revealed atrial fibrillation.  He was admitted started on antithrombotics and converted to sinus rhythm.  Today from a cardiac standpoint he feels better.  No Known Allergies      Past Medical History:   Diagnosis Date   • HTN (hypertension)    • Hyperlipidemia    • Obesity          Past Surgical History:   Procedure Laterality Date   • AMPUTATION DIGIT Left 2007    partial index finger 1st toe (MRSA)   • AMPUTATION DIGIT Right 3/5/2022    Procedure: TOE AMPUTATION RIGHT FOOT SECOND DIGIT;  Surgeon: Wong Chow DPM;  Location: [facility] OR;  Service: Podiatry;  Laterality: Right;   • INTERVENTIONAL RADIOLOGY PROCEDURE N/A 2/21/2022    Procedure: Abdominal Aortagram with Runoff possible angioplasty stent atherectomy thrombolysis;  Surgeon: Massimo Haley MD;  Location: [facility]VAS[facility] LOCATION;  Service: Interventional Radiology;  Laterality: N/A;         History reviewed. No pertinent family history.      Social History     Socioeconomic History   • Marital status:    Tobacco Use   • Smoking status: Current Every Day Smoker     Packs/day: 1.00     Types: Cigarettes   • Smokeless tobacco: Never Used   Vaping Use   • Vaping Use: Never used   Substance and Sexual Activity   • Alcohol use: Yes     Comment: daily, 6-12 beers daily   • Drug use: Never   • Sexual activity: Defer         Prior to Admission medications    Medication Sig Start Date End Date Taking? Authorizing Provider   atorvastatin (LIPITOR) 40 MG tablet Take 1 tablet by mouth Every Night. 2/22/22  Yes Emmie Cooper MD   clopidogrel (PLAVIX) 75 MG tablet  "Take 1 tablet by mouth Daily. 2/23/22  Yes Emmie Cooper MD   dilTIAZem (TIAZAC) 120 MG 24 hr capsule diltiazem  mg capsule,extended release 24 hr   Take 1 capsule every day by oral route.   Yes ProviderJeffery MD   dilTIAZem CD (CARDIZEM CD) 120 MG 24 hr capsule Take 1 capsule by mouth Daily for 90 days. 3/8/22 6/6/22 Yes Svitlana Arriola MD   HYDROcodone-acetaminophen (NORCO)  MG per tablet Take 1 tablet by mouth Every 6 (Six) Hours As Needed for Moderate Pain  or Severe Pain  for up to 12 doses. 3/7/22  Yes Svitlana Arriola MD   HYDROcodone-acetaminophen (Norco) 5-325 MG per tablet Take 1 tablet by mouth Every 12 (Twelve) Hours As Needed for Moderate Pain . 3/21/22  Yes Wong Chow DPM   HYDROcodone-acetaminophen (Norco) 7.5-325 MG per tablet Take 1 tablet by mouth Every 8 (Eight) Hours As Needed for Moderate Pain . 3/14/22  Yes Wong Chow DPM   nicotine polacrilex (NICORETTE) 4 MG gum Every 2 (Two) Hours.   Yes Jeffery Harvey MD   oxyCODONE-acetaminophen (PERCOCET) 7.5-325 MG per tablet Every 8 (Eight) Hours.   Yes ProviderJeffery MD   ProAir  (90 Base) MCG/ACT inhaler  2/11/22  Yes Jeffery Harvey MD   rivaroxaban (XARELTO) 20 MG tablet Take 1 tablet by mouth Daily. Indications: Other - full anticoagulation 2/23/22  Yes Emmie Cooper MD   clopidogrel (PLAVIX) 75 MG tablet clopidogrel 75 mg tablet   Take 1 tablet every day by oral route.    ProviderJeffery MD   atorvastatin (LIPITOR) 40 MG tablet atorvastatin 40 mg tablet   Take 1 tablet every day by oral route.  3/21/22  Jeffery Harvey MD         OBJECTIVE    /78 (BP Location: Left arm, Patient Position: Sitting, Cuff Size: Adult)   Pulse 111   Temp 96.8 °F (36 °C)   Ht 182.9 cm (72\")   Wt 103 kg (227 lb 6.4 oz)   SpO2 98%   BMI 30.84 kg/m²         Review of Systems     Constitutional:  Denies recent weight loss, weight gain, fever or chills, no change in " exercise tolerance     HENT:  Denies any hearing loss, epistaxis, hoarseness, or difficulty speaking.     Eyes: Wears eyeglasses or contact lenses     Respiratory:  Denies dyspnea with exertion,no cough, wheezing, or hemoptysis.     Cardiovascular: Negative for palpations, chest pain, orthopnea, PND, peripheral edema, syncope, or claudication.     Gastrointestinal:  Denies change in bowel habits, dyspepsia, ulcer disease, hematochezia, or melena.     Endocrine: Negative for cold intolerance, heat intolerance, polydipsia, polyphagia and polyuria. Denies any history of weight change, heat/cold intolerance, polydipsia, polyuria     Genitourinary: Negative.      Musculoskeletal: Denies any history of arthritic symptoms or back problems     Skin:  Denies any change in hair or nails, rashes, or skin lesions.     Allergic/Immunologic: Negative.  Negative for environmental allergies, food allergies and immunocompromised state.     Neurological:  Denies any history of recurrent headaches, strokes, TIA, or seizure disorder.     Hematological: Denies any food allergies, seasonal allergies, bleeding disorders, or lymphadenopathy.     Psychiatric/Behavioral: Denies any history of depression, substance abuse, or change in cognitive function.         Physical Exam     Constitutional: Cooperative, alert and oriented, well-developed, well-nourished, in no acute distress.     HENT:   Head: Normocephalic, normal hair patterns, no masses or tenderness.  Ears, Nose, and Throat: No gross abnormalities. No pallor or cyanosis. Dentition good.   Eyes: EOMS intact, PERRL, conjunctivae and lids unremarkable. Fundoscopic exam and visual fields not performed.   Neck: No palpable masses or adenopathy, no thyromegaly, no JVD, carotid pulses are full and equal bilaterally and without  Bruits.     Cardiovascular: Regular rhythm, S1 and S2 normal, no S3 or S4. Apical impulse not displaced. No murmurs, gallops, or rubs detected.     Pulmonary/Chest:  Chest: normal symmetry, no tenderness to palpation, normal respiratory excursion, no intercostal retraction, no use of accessory muscles.            Pulmonary: Normal breath sounds. No rales or ronchi.    Abdominal: Abdomen soft, bowel sounds normoactive, no masses, no hepatosplenomegaly, non-tender, no bruits.     Musculoskeletal: No deformities, clubbing, cyanosis, erythema, or edema observed. There are no spinal abnormalities noted. Normal muscle strength and tone. Pulses full and equal in all extremities, no bruits auscultated.     Neurological: No gross motor or sensory deficits noted, affect appropriate, oriented to time, person, place.     Skin: Warm and dry to the touch, no apparent skin lesions or masses noted.     Psychiatric: He has a normal mood and affect. His behavior is normal. Judgment and thought content normal.         Procedures      Lab Results   Component Value Date    WBC 5.66 03/05/2022    HGB 18.7 (H) 03/05/2022    HCT 54.1 (H) 03/05/2022    MCV 94.3 03/05/2022     (L) 03/05/2022     Lab Results   Component Value Date    GLUCOSE 110 (H) 03/05/2022    BUN 13 03/05/2022    CREATININE 0.85 03/05/2022    EGFRIFNONA 108 02/21/2022    BCR 15.3 03/05/2022    CO2 25.0 03/05/2022    CALCIUM 9.0 03/05/2022    ALBUMIN 3.60 03/03/2022    AST 18 03/03/2022    ALT 12 03/03/2022     No results found for: CHOL  No results found for: TRIG  No results found for: HDL  No components found for: LDLCALC  No results found for: LDL  No results found for: HDLLDLRATIO  No components found for: CHOLHDL  Lab Results   Component Value Date    HGBA1C 5.90 (H) 02/19/2022     Lab Results   Component Value Date    TSH 1.220 03/03/2022           ASSESSMENT AND PLAN      Diagnoses and all orders for this visit:    1. Follow up (Primary)  -     ECG 12 Lead    2. Paroxysmal atrial fibrillation (HCC)    3. Benign essential HTN    4. Acute deep vein thrombosis (DVT) of popliteal vein of left lower extremity (HCC)    5.  Nicotine dependence, cigarettes, with other nicotine-induced disorders    Other orders  -     dilTIAZem CD (CARDIZEM CD) 120 MG 24 hr capsule; Take 1 capsule by mouth Daily for 90 days.  Dispense: 90 capsule; Refill: 3  -     rivaroxaban (XARELTO) 20 MG tablet; Take 1 tablet by mouth Daily. Indications: Other - full anticoagulation  Dispense: 30 tablet; Refill: 11    Patient is status post atrial fibrillation admission to the hospital.  He is now in sinus rhythm.  He is tolerating his diltiazem well and his blood pressures under good control.  He is also on Xarelto with no bleeding.  He had a toe amputation by podiatry.    Patient will continue diltiazem and his Xarelto.  Given his history he should be on this long-term.  He also had a history of DVT and was on a NOAC for short period I explained to him the incidence of stroke his [id number] Score of 4 and the need for long-term anticoagulation.  He is agreeable.    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  Patient's Body mass index is 30.84 kg/m². indicating that he is obese (BMI >30). Obesity-related health conditions include the following: hypertension. Obesity is unchanged. BMI is is above average; BMI management plan is completed. We discussed portion control..      William Alan  reports that he has been smoking cigarettes. He has been smoking about 1.00 pack per day. He has never used smokeless tobacco.. I have educated him on the risk of diseases from using tobacco products such as cancer, COPD and heart disease.     I advised him to quit and he is not willing to quit.    I spent 3  minutes counseling the patient.               Juju Gomes MD  3/21/2022  11:11 CDT

## 2022-03-21 NOTE — PROGRESS NOTES
William Alan  1970  51 y.o. male    Right foot post op second toe amputation    03/21/2022     Chief Complaint   Patient presents with   • Right Foot - Follow-up, Post-op     Post op          History of Present Illness    William Alan is a 51 y.o.male who presents to clinic for his second postoperative visit.  Patient had right second digit amputation on March 5.      Past Medical History:   Diagnosis Date   • HTN (hypertension)    • Hyperlipidemia    • Obesity          Past Surgical History:   Procedure Laterality Date   • AMPUTATION DIGIT Left 2007    partial index finger 1st toe (MRSA)   • AMPUTATION DIGIT Right 3/5/2022    Procedure: TOE AMPUTATION RIGHT FOOT SECOND DIGIT;  Surgeon: Wong Chow DPM;  Location: [facility] OR;  Service: Podiatry;  Laterality: Right;   • INTERVENTIONAL RADIOLOGY PROCEDURE N/A 2/21/2022    Procedure: Abdominal Aortagram with Runoff possible angioplasty stent atherectomy thrombolysis;  Surgeon: Massimo Haley MD;  Location: [facility] INVASIVE LOCATION;  Service: Interventional Radiology;  Laterality: N/A;         History reviewed. No pertinent family history.    No Known Allergies    Social History     Socioeconomic History   • Marital status:    Tobacco Use   • Smoking status: Current Every Day Smoker     Packs/day: 1.00     Types: Cigarettes   • Smokeless tobacco: Never Used   Vaping Use   • Vaping Use: Never used   Substance and Sexual Activity   • Alcohol use: Yes     Comment: daily, 6-12 beers daily   • Drug use: Never   • Sexual activity: Defer         Current Outpatient Medications   Medication Sig Dispense Refill   • atorvastatin (LIPITOR) 40 MG tablet Take 1 tablet by mouth Every Night. 30 tablet 0   • clopidogrel (PLAVIX) 75 MG tablet Take 1 tablet by mouth Daily. 30 tablet 0   • dilTIAZem CD (CARDIZEM CD) 120 MG 24 hr capsule Take 1 capsule by mouth Daily for 90 days. 90 capsule 0   • HYDROcodone-acetaminophen (NORCO)  MG per tablet Take 1  "tablet by mouth Every 6 (Six) Hours As Needed for Moderate Pain  or Severe Pain  for up to 12 doses. 12 tablet 0   • rivaroxaban (XARELTO) 20 MG tablet Take 1 tablet by mouth Daily. Indications: Other - full anticoagulation 30 tablet 0   • HYDROcodone-acetaminophen (Norco) 5-325 MG per tablet Take 1 tablet by mouth Every 12 (Twelve) Hours As Needed for Moderate Pain . 14 tablet 0   • HYDROcodone-acetaminophen (Norco) 7.5-325 MG per tablet Take 1 tablet by mouth Every 8 (Eight) Hours As Needed for Moderate Pain . 21 tablet 0     No current facility-administered medications for this visit.       Review of Systems   Musculoskeletal:        Foot pain         OBJECTIVE    Pulse (!) 122   Ht 182.9 cm (72\")   Wt 97.5 kg (215 lb)   SpO2 97%   BMI 29.16 kg/m²     Physical Exam  Vitals reviewed.   Constitutional:       General: He is not in acute distress.     Appearance: He is well-developed.   HENT:      Head: Normocephalic and atraumatic.      Nose: Nose normal.   Eyes:      Conjunctiva/sclera: Conjunctivae normal.      Pupils: Pupils are equal, round, and reactive to light.   Pulmonary:      Effort: Pulmonary effort is normal. No respiratory distress.      Breath sounds: No wheezing.   Musculoskeletal:         General: No deformity. Normal range of motion.   Skin:     General: Skin is warm and dry.      Capillary Refill: Capillary refill takes less than 2 seconds.   Neurological:      Mental Status: He is alert and oriented to person, place, and time.   Psychiatric:         Behavior: Behavior normal.         Thought Content: Thought content normal.          Right lower extremity: Incision site to partial second digit amputation well approximated.  No signs of dehiscence.      Foot/Ankle Exam        Procedures        ASSESSMENT AND PLAN    Diagnoses and all orders for this visit:    1. Gangrene of toe (HCC) (Primary)  -     HYDROcodone-acetaminophen (Norco) 5-325 MG per tablet; Take 1 tablet by mouth Every 12 (Twelve) " Hours As Needed for Moderate Pain .  Dispense: 14 tablet; Refill: 0        Patient doing well postoperative.  Sutures removed.  Small area of delayed incisional healing.  Site care as instructed.  Refilled pain medicine.  Recheck 1 week.          This document has been electronically signed by Wong Chow DPM on March 21, 2022 10:02 CDT     3/21/2022  10:02 CDT

## 2022-03-25 LAB
QT INTERVAL: 330 MS
QTC INTERVAL: 448 MS

## 2022-03-29 ENCOUNTER — OFFICE VISIT (OUTPATIENT)
Dept: PODIATRY | Facility: CLINIC | Age: 52
End: 2022-03-29

## 2022-03-29 VITALS — WEIGHT: 227 LBS | HEART RATE: 106 BPM | BODY MASS INDEX: 30.75 KG/M2 | HEIGHT: 72 IN | OXYGEN SATURATION: 97 %

## 2022-03-29 DIAGNOSIS — I96 GANGRENE OF TOE: Primary | ICD-10-CM

## 2022-03-29 PROCEDURE — 99212 OFFICE O/P EST SF 10 MIN: CPT | Performed by: PODIATRIST

## 2022-03-29 RX ORDER — IPRATROPIUM BROMIDE AND ALBUTEROL SULFATE 2.5; .5 MG/3ML; MG/3ML
3 SOLUTION RESPIRATORY (INHALATION)
COMMUNITY

## 2022-03-29 NOTE — PROGRESS NOTES
William Alan  1970  51 y.o. male      3/29/2022    Chief Complaint   Patient presents with   • Right Foot - Post-op       History of Present Illness    William Alan is a 51 y.o.male who presents to clinic for his  postoperative visit.  Patient had right second digit amputation on March 5. Patient is doing well.       Past Medical History:   Diagnosis Date   • HTN (hypertension)    • Hyperlipidemia    • Obesity          Past Surgical History:   Procedure Laterality Date   • AMPUTATION DIGIT Left 2007    partial index finger 1st toe (MRSA)   • AMPUTATION DIGIT Right 3/5/2022    Procedure: TOE AMPUTATION RIGHT FOOT SECOND DIGIT;  Surgeon: Wong Chow DPM;  Location: [facility] OR;  Service: Podiatry;  Laterality: Right;   • INTERVENTIONAL RADIOLOGY PROCEDURE N/A 2/21/2022    Procedure: Abdominal Aortagram with Runoff possible angioplasty stent atherectomy thrombolysis;  Surgeon: Massimo Haley MD;  Location: [facility]GIO INVASIVE LOCATION;  Service: Interventional Radiology;  Laterality: N/A;         History reviewed. No pertinent family history.    No Known Allergies    Social History     Socioeconomic History   • Marital status:    Tobacco Use   • Smoking status: Current Every Day Smoker     Packs/day: 1.00     Types: Cigarettes   • Smokeless tobacco: Never Used   Vaping Use   • Vaping Use: Never used   Substance and Sexual Activity   • Alcohol use: Yes     Comment: daily, 6-12 beers daily   • Drug use: Never   • Sexual activity: Defer         Current Outpatient Medications   Medication Sig Dispense Refill   • atorvastatin (LIPITOR) 40 MG tablet Take 1 tablet by mouth Every Night. 30 tablet 0   • clopidogrel (PLAVIX) 75 MG tablet Take 1 tablet by mouth Daily. 30 tablet 0   • dilTIAZem (TIAZAC) 120 MG 24 hr capsule diltiazem  mg capsule,extended release 24 hr   Take 1 capsule every day by oral route.     • dilTIAZem CD (CARDIZEM CD) 120 MG 24 hr capsule Take 1 capsule by mouth Daily for 90  "days. 90 capsule 3   • HYDROcodone-acetaminophen (Norco) 5-325 MG per tablet Take 1 tablet by mouth Every 12 (Twelve) Hours As Needed for Moderate Pain . 14 tablet 0   • HYDROcodone-acetaminophen (Norco) 7.5-325 MG per tablet Take 1 tablet by mouth Every 8 (Eight) Hours As Needed for Moderate Pain . 21 tablet 0   • ipratropium-albuterol (DUO-NEB) 0.5-2.5 mg/3 ml nebulizer 3 mL.     • nicotine polacrilex (NICORETTE) 4 MG gum Every 2 (Two) Hours.     • oxyCODONE-acetaminophen (PERCOCET) 7.5-325 MG per tablet Every 8 (Eight) Hours.     • ProAir  (90 Base) MCG/ACT inhaler      • rivaroxaban (XARELTO) 20 MG tablet Take 1 tablet by mouth Daily. Indications: Other - full anticoagulation 30 tablet 11   • HYDROcodone-acetaminophen (NORCO)  MG per tablet Take 1 tablet by mouth Every 6 (Six) Hours As Needed for Moderate Pain  or Severe Pain  for up to 12 doses. 12 tablet 0     No current facility-administered medications for this visit.       Review of Systems   Musculoskeletal:        Foot pain         OBJECTIVE    Pulse 106   Ht 182.9 cm (72\")   Wt 103 kg (227 lb)   SpO2 97%   BMI 30.79 kg/m²     Physical Exam  Vitals reviewed.   Constitutional:       General: He is not in acute distress.     Appearance: He is well-developed.   HENT:      Head: Normocephalic and atraumatic.      Nose: Nose normal.   Eyes:      Conjunctiva/sclera: Conjunctivae normal.      Pupils: Pupils are equal, round, and reactive to light.   Pulmonary:      Effort: Pulmonary effort is normal. No respiratory distress.      Breath sounds: No wheezing.   Musculoskeletal:         General: No deformity. Normal range of motion.   Skin:     General: Skin is warm and dry.      Capillary Refill: Capillary refill takes less than 2 seconds.   Neurological:      Mental Status: He is alert and oriented to person, place, and time.   Psychiatric:         Behavior: Behavior normal.         Thought Content: Thought content normal.          Right lower " extremity: Incision site to partial second digit amputation with delayed healing to the lateral aspect.      Foot/Ankle Exam        Procedures        ASSESSMENT AND PLAN    Diagnoses and all orders for this visit:    1. Gangrene of toe (HCC) (Primary)        Patient doing well postoperative.   Small area of delayed incisional healing, improving.  Site care as instructed.  Refilled pain medicine.  Recheck 2 week.s          This document has been electronically signed by Wong Chow DPM on March 31, 2022 19:31 CDT     3/31/2022  19:31 CDT

## 2022-04-26 ENCOUNTER — OFFICE VISIT (OUTPATIENT)
Dept: PODIATRY | Facility: CLINIC | Age: 52
End: 2022-04-26

## 2022-04-26 VITALS — HEART RATE: 84 BPM | HEIGHT: 72 IN | OXYGEN SATURATION: 99 % | WEIGHT: 227 LBS | BODY MASS INDEX: 30.75 KG/M2

## 2022-04-26 DIAGNOSIS — I96 GANGRENE OF TOE: Primary | ICD-10-CM

## 2022-04-26 PROCEDURE — 99212 OFFICE O/P EST SF 10 MIN: CPT | Performed by: PODIATRIST

## 2022-04-26 NOTE — PROGRESS NOTES
William Alan  1970  51 y.o. male      04/26/2022      Chief Complaint   Patient presents with   • Right Foot - Follow-up       History of Present Illness    William Alan is a 51 y.o.male who presents to clinic for his  postoperative visit.  Patient had right second digit amputation on March 5. Patient is doing well.       Past Medical History:   Diagnosis Date   • HTN (hypertension)    • Hyperlipidemia    • Obesity          Past Surgical History:   Procedure Laterality Date   • AMPUTATION DIGIT Left 2007    partial index finger 1st toe (MRSA)   • AMPUTATION DIGIT Right 3/5/2022    Procedure: TOE AMPUTATION RIGHT FOOT SECOND DIGIT;  Surgeon: Wong Chow DPM;  Location: [facility] OR;  Service: Podiatry;  Laterality: Right;   • INTERVENTIONAL RADIOLOGY PROCEDURE N/A 2/21/2022    Procedure: Abdominal Aortagram with Runoff possible angioplasty stent atherectomy thrombolysis;  Surgeon: Massimo Haley MD;  Location: [facility]GIO INVASIVE LOCATION;  Service: Interventional Radiology;  Laterality: N/A;         History reviewed. No pertinent family history.    No Known Allergies    Social History     Socioeconomic History   • Marital status:    Tobacco Use   • Smoking status: Current Every Day Smoker     Packs/day: 1.00     Types: Cigarettes   • Smokeless tobacco: Never Used   Vaping Use   • Vaping Use: Never used   Substance and Sexual Activity   • Alcohol use: Yes     Comment: daily, 6-12 beers daily   • Drug use: Never   • Sexual activity: Defer         Current Outpatient Medications   Medication Sig Dispense Refill   • atorvastatin (LIPITOR) 40 MG tablet Take 1 tablet by mouth Every Night. 30 tablet 0   • clopidogrel (PLAVIX) 75 MG tablet Take 1 tablet by mouth Daily. 30 tablet 0   • dilTIAZem (TIAZAC) 120 MG 24 hr capsule diltiazem  mg capsule,extended release 24 hr   Take 1 capsule every day by oral route.     • dilTIAZem CD (CARDIZEM CD) 120 MG 24 hr capsule Take 1 capsule by mouth Daily for  "90 days. 90 capsule 3   • ipratropium-albuterol (DUO-NEB) 0.5-2.5 mg/3 ml nebulizer 3 mL.     • nicotine polacrilex (NICORETTE) 4 MG gum Every 2 (Two) Hours.     • ProAir  (90 Base) MCG/ACT inhaler      • rivaroxaban (XARELTO) 20 MG tablet Take 1 tablet by mouth Daily. Indications: Other - full anticoagulation 30 tablet 11     No current facility-administered medications for this visit.       Review of Systems   Constitutional: Negative.    HENT: Negative.    Eyes: Negative.    Respiratory: Negative.    Cardiovascular: Negative.    Gastrointestinal: Negative.    Endocrine: Negative.    Genitourinary: Negative.    Musculoskeletal: Negative.         Foot pain   Skin: Negative.    Allergic/Immunologic: Negative.    Neurological: Negative.    Hematological: Negative.    Psychiatric/Behavioral: Negative.          OBJECTIVE    Pulse 84   Ht 182.9 cm (72\")   Wt 103 kg (227 lb)   SpO2 99%   BMI 30.79 kg/m²     Physical Exam  Vitals reviewed.   Constitutional:       General: He is not in acute distress.     Appearance: He is well-developed.   HENT:      Head: Normocephalic and atraumatic.      Nose: Nose normal.   Eyes:      Conjunctiva/sclera: Conjunctivae normal.      Pupils: Pupils are equal, round, and reactive to light.   Pulmonary:      Effort: Pulmonary effort is normal. No respiratory distress.      Breath sounds: No wheezing.   Musculoskeletal:         General: No deformity. Normal range of motion.   Skin:     General: Skin is warm and dry.      Capillary Refill: Capillary refill takes less than 2 seconds.   Neurological:      Mental Status: He is alert and oriented to person, place, and time.   Psychiatric:         Behavior: Behavior normal.         Thought Content: Thought content normal.          Right lower extremity: Incision site to partial second digit amputation with delayed healing to the lateral aspect, improving.      Foot/Ankle Exam        Procedures        ASSESSMENT AND PLAN    Diagnoses and " all orders for this visit:    1. Gangrene of toe (HCC) (Primary)        Patient doing well postoperative.   Small area of delayed incisional healing, improving.  Site care as instructed.   Recheck 2 week.s          This document has been electronically signed by Wong Chow DPM on April 26, 2022 15:58 CDT     4/26/2022  15:58 CDT

## 2022-06-21 ENCOUNTER — OFFICE VISIT (OUTPATIENT)
Dept: PODIATRY | Facility: CLINIC | Age: 52
End: 2022-06-21

## 2022-06-21 VITALS — BODY MASS INDEX: 30.75 KG/M2 | WEIGHT: 227 LBS | HEIGHT: 72 IN

## 2022-06-21 DIAGNOSIS — I96 GANGRENE OF TOE: Primary | ICD-10-CM

## 2022-06-21 PROCEDURE — 99212 OFFICE O/P EST SF 10 MIN: CPT | Performed by: PODIATRIST

## 2022-06-21 RX ORDER — GABAPENTIN 800 MG/1
TABLET ORAL
COMMUNITY
Start: 2022-05-12

## 2022-06-21 RX ORDER — CILOSTAZOL 100 MG/1
TABLET ORAL
COMMUNITY
Start: 2022-05-02

## 2022-06-21 NOTE — PROGRESS NOTES
William Alan  1970  51 y.o. male      06/21/2022    Post-op follow-up surgery 03/05/2022  Chief Complaint   Patient presents with   • Right Foot - Post-op Follow-up       History of Present Illness    William Alan is a 51 y.o.male who presents to clinic for his  postoperative visit.  Patient had right second digit amputation on March 5. Patient is doing well.       Past Medical History:   Diagnosis Date   • HTN (hypertension)    • Hyperlipidemia    • Obesity          Past Surgical History:   Procedure Laterality Date   • AMPUTATION DIGIT Left 2007    partial index finger 1st toe (MRSA)   • AMPUTATION DIGIT Right 3/5/2022    Procedure: TOE AMPUTATION RIGHT FOOT SECOND DIGIT;  Surgeon: Wong Chow DPM;  Location: [facility] OR;  Service: Podiatry;  Laterality: Right;   • INTERVENTIONAL RADIOLOGY PROCEDURE N/A 2/21/2022    Procedure: Abdominal Aortagram with Runoff possible angioplasty stent atherectomy thrombolysis;  Surgeon: Massimo Haley MD;  Location: [facility]GIO INVASIVE LOCATION;  Service: Interventional Radiology;  Laterality: N/A;         History reviewed. No pertinent family history.    No Known Allergies    Social History     Socioeconomic History   • Marital status:    Tobacco Use   • Smoking status: Current Every Day Smoker     Packs/day: 1.00     Types: Cigarettes   • Smokeless tobacco: Never Used   Vaping Use   • Vaping Use: Never used   Substance and Sexual Activity   • Alcohol use: Yes     Comment: daily, 6-12 beers daily   • Drug use: Never   • Sexual activity: Defer         Current Outpatient Medications   Medication Sig Dispense Refill   • atorvastatin (LIPITOR) 40 MG tablet Take 1 tablet by mouth Every Night. 30 tablet 0   • dilTIAZem (TIAZAC) 120 MG 24 hr capsule diltiazem  mg capsule,extended release 24 hr   Take 1 capsule every day by oral route.     • gabapentin (NEURONTIN) 800 MG tablet      • nicotine polacrilex (NICORETTE) 4 MG gum Every 2 (Two) Hours.     •  "ProAir  (90 Base) MCG/ACT inhaler      • rivaroxaban (XARELTO) 20 MG tablet Take 1 tablet by mouth Daily. Indications: Other - full anticoagulation 30 tablet 11   • cilostazol (PLETAL) 100 MG tablet      • clopidogrel (PLAVIX) 75 MG tablet Take 1 tablet by mouth Daily. 30 tablet 0   • dilTIAZem CD (CARDIZEM CD) 120 MG 24 hr capsule Take 1 capsule by mouth Daily for 90 days. 90 capsule 3   • ipratropium-albuterol (DUO-NEB) 0.5-2.5 mg/3 ml nebulizer 3 mL.       No current facility-administered medications for this visit.       Review of Systems   Constitutional: Negative.    HENT: Negative.    Eyes: Negative.    Respiratory: Negative.    Cardiovascular: Negative.    Gastrointestinal: Negative.    Endocrine: Negative.    Genitourinary: Negative.    Musculoskeletal: Negative.         Foot pain   Skin: Negative.    Allergic/Immunologic: Negative.    Neurological: Negative.    Hematological: Negative.    Psychiatric/Behavioral: Negative.          OBJECTIVE    Ht 182.9 cm (72\")   Wt 103 kg (227 lb)   BMI 30.79 kg/m²     Physical Exam  Vitals reviewed.   Constitutional:       General: He is not in acute distress.     Appearance: He is well-developed.   HENT:      Head: Normocephalic and atraumatic.      Nose: Nose normal.   Eyes:      Conjunctiva/sclera: Conjunctivae normal.      Pupils: Pupils are equal, round, and reactive to light.   Pulmonary:      Effort: Pulmonary effort is normal. No respiratory distress.      Breath sounds: No wheezing.   Musculoskeletal:         General: No deformity. Normal range of motion.   Skin:     General: Skin is warm and dry.      Capillary Refill: Capillary refill takes less than 2 seconds.   Neurological:      Mental Status: He is alert and oriented to person, place, and time.   Psychiatric:         Behavior: Behavior normal.         Thought Content: Thought content normal.          Right lower extremity: Incision site to partial second digit amputation is healed. No open wounds. "       Foot/Ankle Exam        Procedures        ASSESSMENT AND PLAN    Diagnoses and all orders for this visit:    1. Gangrene of toe (HCC) (Primary)        Patient doing well postoperative.  Cass as needed.           This document has been electronically signed by Wong Chow DPM on June 22, 2022 07:46 CDT     6/22/2022  07:46 CDT

## 2023-04-10 RX ORDER — DILTIAZEM HYDROCHLORIDE 120 MG/1
120 CAPSULE, EXTENDED RELEASE ORAL DAILY
Qty: 90 CAPSULE | Refills: 3 | Status: SHIPPED | OUTPATIENT
Start: 2023-04-10

## 2023-05-02 ENCOUNTER — LAB (OUTPATIENT)
Dept: LAB | Facility: HOSPITAL | Age: 53
End: 2023-05-02
Payer: MEDICAID

## 2023-05-02 ENCOUNTER — OFFICE VISIT (OUTPATIENT)
Dept: CARDIOLOGY | Facility: CLINIC | Age: 53
End: 2023-05-02
Payer: MEDICAID

## 2023-05-02 VITALS
HEIGHT: 72 IN | RESPIRATION RATE: 18 BRPM | DIASTOLIC BLOOD PRESSURE: 85 MMHG | SYSTOLIC BLOOD PRESSURE: 160 MMHG | WEIGHT: 222.4 LBS | HEART RATE: 93 BPM | BODY MASS INDEX: 30.12 KG/M2 | OXYGEN SATURATION: 96 %

## 2023-05-02 DIAGNOSIS — E66.2 CLASS 1 OBESITY WITH ALVEOLAR HYPOVENTILATION, SERIOUS COMORBIDITY, AND BODY MASS INDEX (BMI) OF 30.0 TO 30.9 IN ADULT: ICD-10-CM

## 2023-05-02 DIAGNOSIS — I48.0 AF (PAROXYSMAL ATRIAL FIBRILLATION): Primary | ICD-10-CM

## 2023-05-02 DIAGNOSIS — I73.9 PVD (PERIPHERAL VASCULAR DISEASE): Primary | ICD-10-CM

## 2023-05-02 DIAGNOSIS — I73.9 PVD (PERIPHERAL VASCULAR DISEASE): ICD-10-CM

## 2023-05-02 DIAGNOSIS — I10 PRIMARY HYPERTENSION: ICD-10-CM

## 2023-05-02 DIAGNOSIS — Z72.0 TOBACCO USE: ICD-10-CM

## 2023-05-02 PROCEDURE — 80061 LIPID PANEL: CPT | Performed by: INTERNAL MEDICINE

## 2023-05-02 RX ORDER — CARVEDILOL 12.5 MG/1
12.5 TABLET ORAL 2 TIMES DAILY
Qty: 180 TABLET | Refills: 1 | Status: SHIPPED | OUTPATIENT
Start: 2023-05-02

## 2023-05-02 NOTE — PROGRESS NOTES
[facility] Cardiology  OFFICE NOTE    Cardiovascular Medicine  Angelito Gordon M.D., [facility]         No referring provider defined for this encounter.    History of Present Illness    William Alan is a 52 y.o. male who presents for a follow up visit today.  He has seen Dr. Gomes in the past.   Based on prior notes, he has a known history of paroxysmal atrial fibrillation and venous thromboembolism.  He has been maintained on Xarelto therapy.  Last transthoracic echocardiogram was in February 2022.  It reported LVEF of 56 to 60%, trace MR, mild TR and no evidence of pericardial effusion.  There was suspicion of bicuspid aortic valve based on report.   He also has a known history of peripheral vascular disease and has been treated by Dr. Haley before.  He denies having any recent episodes of chest discomfort, exertional dyspnea, PND, orthopnea or leg swelling.  He has not had any palpitations or heart racing.  He has not had any dizziness, presyncope or syncope.  He denies having any significant discomfort in his lower extremities at this point.  He is not taking multiple medications prescribed to him for peripheral vascular disease at this point.     Review of Systems - ROS  Constitution: Negative for weight gain and weight loss.   HENT: Negative for congestion.    Eyes: Negative for blurred vision.   Cardiovascular: As mentioned above  Respiratory: Negative for cough and hemoptysis.    Endocrine: Negative for polydipsia and polyuria.   Hematologic/Lymphatic: Negative for bleeding problem. Does not bruise/bleed easily.   Skin: Negative for flushing.   Musculoskeletal: Negative for neck pain and stiffness.   Gastrointestinal: Negative for abdominal pain, nausea and vomiting.   Genitourinary: Negative for dysuria and hematuria.   Neurological: Negative for dizziness, focal weakness and numbness.   Psychiatric/Behavioral: Negative for altered mental status and depression.      All other  systems were reviewed and were negative.    Past Medical History:   Diagnosis Date   • HTN (hypertension)    • Hyperlipidemia    • Obesity        Family History:  family history includes Anuerysm in his mother; Cancer in his father; Mental illness in his sister; Stroke in his mother.    Social History: He has been smoking 1 PPD of cigarettes currently.  He reports smoking for the past 30 years or so.  He drinks 6 pack of beer every day.  Denies current or past illicit drug use.   reports that he has been smoking cigarettes. He has been smoking an average of 1 pack per day. He has never used smokeless tobacco. He reports current alcohol use. He reports that he does not use drugs.    Allergies:  No Known Allergies      Current Outpatient Medications:   •  dilTIAZem (TIAZAC) 120 MG 24 hr capsule, Take 1 capsule by mouth Daily., Disp: 90 capsule, Rfl: 3  •  rivaroxaban (XARELTO) 20 MG tablet, Take 1 tablet by mouth Daily. Indications: Other - full anticoagulation, Disp: 30 tablet, Rfl: 11  •  atorvastatin (LIPITOR) 40 MG tablet, Take 1 tablet by mouth Every Night. (Patient not taking: Reported on 5/2/2023), Disp: 30 tablet, Rfl: 0  •  carvedilol (COREG) 12.5 MG tablet, Take 1 tablet by mouth 2 (Two) Times a Day., Disp: 180 tablet, Rfl: 1  •  cilostazol (PLETAL) 100 MG tablet, , Disp: , Rfl:   •  clopidogrel (PLAVIX) 75 MG tablet, Take 1 tablet by mouth Daily. (Patient not taking: Reported on 5/2/2023), Disp: 30 tablet, Rfl: 0  •  gabapentin (NEURONTIN) 800 MG tablet, , Disp: , Rfl:   •  ipratropium-albuterol (DUO-NEB) 0.5-2.5 mg/3 ml nebulizer, 3 mL. (Patient not taking: Reported on 5/2/2023), Disp: , Rfl:   •  nicotine polacrilex (NICORETTE) 4 MG gum, Every 2 (Two) Hours. (Patient not taking: Reported on 5/2/2023), Disp: , Rfl:   •  ProAir  (90 Base) MCG/ACT inhaler, , Disp: , Rfl:     Physical Exam:  Vitals:    05/02/23 1057   BP: 160/85   BP Location: Right arm   Patient Position: Sitting   Cuff Size: Adult  "  Pulse: 93   Resp: 18   SpO2: 96%   Weight: 101 kg (222 lb 6.4 oz)   Height: 182.9 cm (72.01\")   PainSc: 0-No pain   PainLoc: Chest     Current Pain Level: none  Pulse Ox: Normal  on room air  General: alert, appears stated age and cooperative     Body Habitus: Borderline obese  HEENT: Head: Normocephalic, no lesions, without obvious abnormality.   Neuro: alert, oriented x3  JVP: Volume/Pulsation: Normal.  Normal waveforms.   Carotid Exam: no bruit normal pulsation bilaterally   Carotid Volume: normal.     Respirations: no increased work of breathing   Chest:  Normal    Pulmonary: Inspiratory wheezes auscultated throughout   Heart rate: normal    Heart Rhythm: regular     Heart Sounds: S1: normal  S2: normal  S3: absent     Abdomen:   Appearance: normal .  Palpation: Soft, non-tender to palpation, bowel sounds positive in all four quadrants  Extremity: no significant pitting lower extremity edema.       DATA REVIEWED:     EKG. I personally reviewed and interpreted the EKG.  normal sinus rhythm, possible septal infarct on 5/2/2023    ECG/EMG Results (all)     Procedure Component Value Units Date/Time    ECG 12 Lead [[id number]] Collected: 05/02/23 1104     Updated: 05/02/23 1059     QT Interval 376 ms      QTC Interval 467 ms     Narrative:      Test Reason : Af  Blood Pressure :   */*   mmHG  Vent. Rate :  93 BPM     Atrial Rate :  93 BPM     P-R Int : 178 ms          QRS Dur :  78 ms      QT Int : 376 ms       P-R-T Axes :  71  41  65 degrees     QTc Int : 467 ms    Normal sinus rhythm  Septal infarct , age undetermined  Abnormal ECG  When compared with ECG of 21-MAR-2022 10:30,  Septal infarct is now Present    Referred By:            Confirmed By:         ---------------------------------------------------  TTE/DIOGENES:  Results for orders placed during the hospital encounter of 02/18/22    Adult Transthoracic Echo Complete w/ Color, Spectral and Contrast if Necessary Per Protocol    Interpretation Summary  · Left " ventricular wall thickness is consistent with mild concentric hypertrophy.  · Left ventricular ejection fraction appears to be 56 - 60%.  · The right ventricular cavity is borderline dilated.  · Saline test results are negative for right to left atrial level shunt.  · Estimated right ventricular systolic pressure from tricuspid regurgitation is normal (<35 mmHg).  · The right atrial cavity is borderline dilated.      -----------------------------------------------------  CXR/Imaging:   Imaging Results (Most Recent)     None          -----------------------------------------------------  CT:   No radiology results for the last 30 days.    ----------------------------------------------------      --------------------------------------------------------------------------------------------------  LABS:     The CVD Risk score (Sylvie et al., 2008) failed to calculate for the following reasons:    The patient has a prior MI, stroke, CHF, or peripheral vascular disease diagnosis         Lab Results   Component Value Date    GLUCOSE 110 (H) 03/05/2022    BUN 13 03/05/2022    CREATININE 0.85 03/05/2022    EGFRIFNONA 108 02/21/2022    BCR 15.3 03/05/2022    K 4.0 03/05/2022    CO2 25.0 03/05/2022    CALCIUM 9.0 03/05/2022    ALBUMIN 3.60 03/03/2022    AST 18 03/03/2022    ALT 12 03/03/2022     Lab Results   Component Value Date    WBC 5.66 03/05/2022    HGB 18.7 (H) 03/05/2022    HCT 54.1 (H) 03/05/2022    MCV 94.3 03/05/2022     (L) 03/05/2022     No results found for: CHOL, CHLPL, TRIG, HDL, LDL, LDLDIRECT  Lab Results   Component Value Date    TSH 1.220 03/03/2022     Lab Results   Component Value Date    TROPONINT <0.010 03/02/2022     Lab Results   Component Value Date    HGBA1C 5.90 (H) 02/19/2022     No results found for: DDIMER  Lab Results   Component Value Date    ALT 12 03/03/2022     Lab Results   Component Value Date    HGBA1C 5.90 (H) 02/19/2022     Lab Results   Component Value Date    CREATININE  0.85 03/05/2022     No results found for: IRON, TIBC, FERRITIN  Lab Results   Component Value Date    INR 1.06 03/05/2022    INR 1.05 02/18/2022    PROTIME 13.7 03/05/2022    PROTIME 13.6 02/18/2022       [unfilled]    1. AF (paroxysmal atrial fibrillation)  He denies any symptoms that can be attributable to atrial fibrillation at this point.  Serum TSH and free T4 levels were normal in March 2022.  His [id number] score is at least 4.  Continue Xarelto and diltiazem therapy.  Initiate carvedilol therapy as detailed below.  - ECG 12 Lead  - carvedilol (COREG) 12.5 MG tablet; Take 1 tablet by mouth 2 (Two) Times a Day.  Dispense: 180 tablet; Refill: 1    2. Primary hypertension  Clinic BP was 160/85 mm Hg today.  He is currently on diltiazem 120 mg orally daily.  We will initiate carvedilol 12.5 mg orally twice daily.  Dietary sodium restriction was discussed.  - carvedilol (COREG) 12.5 MG tablet; Take 1 tablet by mouth 2 (Two) Times a Day.  Dispense: 180 tablet; Refill: 1    3. PVD (peripheral vascular disease)  He has been treated by Dr. Haley in the past.  He is currently not taking many medications prescribed for peripheral vascular disease.  I have put in a referral for vascular surgery today and encouraged him to establish a follow-up visit with them.  Obtain fasting lipid profile today.  - Lipid Panel  - Ambulatory Referral to Vascular Surgery    4. Tobacco use  He was counseled to quit smoking cigarettes as detailed below.    5. Class 1 obesity with alveolar hypoventilation, serious comorbidity, and body mass index (BMI) of 30.0 to 30.9 in adult  Dietary modification and regular physical activity were discussed.      Prevention:  BMI is >= 30 and <35. (Class 1 Obesity). The following options were offered after discussion;: referral to primary care      William Alan  reports that he has been smoking cigarettes. He has been smoking an average of 1 pack per day. He has never used smokeless tobacco.. I  have educated him on the risk of diseases from using tobacco products such as cancer, COPD and heart disease.     I advised him to quit and he is not willing to quit.    I spent 3  minutes counseling the patient.    Return in about 6 months (around 11/2/2023).            Electronically signed by Angelito Gordon MD on 05/02/23 at 11:17 CDT

## 2023-05-03 LAB
CHOLEST SERPL-MCNC: 155 MG/DL (ref 0–200)
HDLC SERPL-MCNC: 70 MG/DL (ref 40–60)
LDLC SERPL CALC-MCNC: 67 MG/DL (ref 0–100)
LDLC/HDLC SERPL: 0.93 {RATIO}
TRIGL SERPL-MCNC: 99 MG/DL (ref 0–150)
VLDLC SERPL-MCNC: 18 MG/DL (ref 5–40)

## 2023-05-07 LAB
QT INTERVAL: 346 MS
QTC INTERVAL: 431 MS

## 2023-05-08 ENCOUNTER — TELEPHONE (OUTPATIENT)
Dept: CARDIOLOGY | Facility: CLINIC | Age: 53
End: 2023-05-08
Payer: MEDICAID

## 2023-05-08 NOTE — TELEPHONE ENCOUNTER
Contacted patient per dr. Gordon Serum LDL cholesterol and triglyceride levels are close to goal. He was not available and no VM.    ----- Message from Angelito Gordon MD sent at 5/6/2023 12:45 PM CDT -----  Serum LDL cholesterol and triglyceride levels are close to goal.

## (undated) DEVICE — Device

## (undated) DEVICE — NDL HYPO ECLPS SFTY 25G 1 1/2IN

## (undated) DEVICE — RADIFOCUS GLIDEWIRE ADVANTAGE GUIDEWIRE: Brand: GLIDEWIRE ADVANTAGE

## (undated) DEVICE — PERCLOSE PROGLIDE™ SUTURE-MEDIATED CLOSURE SYSTEM: Brand: PERCLOSE PROGLIDE™

## (undated) DEVICE — GOWN,PREVENTION PLUS,XLNG/XXLARGE,STRL: Brand: MEDLINE

## (undated) DEVICE — 1010 S-DRAPE TOWEL DRAPE 10/BX: Brand: STERI-DRAPE™

## (undated) DEVICE — SPNG LAP 18X18IN LF STRL PK/5

## (undated) DEVICE — PK POD 60

## (undated) DEVICE — CATH GUIDE SOFTVU SELECT/V HT OMNI .038 5F 65CM

## (undated) DEVICE — DESTINATION RENAL GUIDING SHEATH: Brand: DESTINATION

## (undated) DEVICE — UNDRPD BREATH 23X36 BG/10

## (undated) DEVICE — SUT PROLN 3/0 SH D/A 36IN 8522H

## (undated) DEVICE — STERILE POLYISOPRENE POWDER-FREE SURGICAL GLOVES WITH EMOLLIENT COATING: Brand: PROTEXIS

## (undated) DEVICE — RADIFOCUS GLIDEWIRE: Brand: GLIDEWIRE

## (undated) DEVICE — SUT ETHLN 4/0 PS2 PLSTC 1667G

## (undated) DEVICE — SOL IRR NACL 0.9PCT BT 1000ML

## (undated) DEVICE — GLV SURG TRIUMPH PF LTX 7 STRL

## (undated) DEVICE — CONTAINER,SPECIMEN,OR STERILE,4OZ: Brand: MEDLINE

## (undated) DEVICE — PK ANGIO LF 60

## (undated) DEVICE — DEV INFL MONARCH 20ML

## (undated) DEVICE — GLV SURG TRIUMPH ORTHO W/ALOE PF LTX 7.5 STRL

## (undated) DEVICE — TBG HI PRESSURE 500PSI 20IN

## (undated) DEVICE — PINNACLE INTRODUCER SHEATH: Brand: PINNACLE

## (undated) DEVICE — SUT VIC DEXON PRE 4 4/0 18IN J496G

## (undated) DEVICE — KT INTRO MINISTICK MAX W/GW PALLADIUM ECHO 4F 21G 7CM

## (undated) DEVICE — ANTIBACTERIAL UNDYED BRAIDED (POLYGLACTIN 910), SYNTHETIC ABSORBABLE SUTURE: Brand: COATED VICRYL

## (undated) DEVICE — PATIENT RETURN ELECTRODE, SINGLE-USE, CONTACT QUALITY MONITORING, ADULT, WITH 9FT CORD, FOR PATIENTS WEIGING OVER 33LBS. (15KG): Brand: MEGADYNE

## (undated) DEVICE — RADIFOCUS GLIDECATH: Brand: GLIDECATH